# Patient Record
Sex: FEMALE | Race: WHITE | NOT HISPANIC OR LATINO | Employment: FULL TIME | URBAN - NONMETROPOLITAN AREA
[De-identification: names, ages, dates, MRNs, and addresses within clinical notes are randomized per-mention and may not be internally consistent; named-entity substitution may affect disease eponyms.]

---

## 2021-10-26 ENCOUNTER — HOSPITAL ENCOUNTER (EMERGENCY)
Facility: HOSPITAL | Age: 61
End: 2021-10-26

## 2024-10-29 ENCOUNTER — OFFICE VISIT (OUTPATIENT)
Dept: FAMILY MEDICINE CLINIC | Facility: CLINIC | Age: 64
End: 2024-10-29
Payer: MEDICAID

## 2024-10-29 VITALS
OXYGEN SATURATION: 96 % | RESPIRATION RATE: 14 BRPM | HEART RATE: 63 BPM | BODY MASS INDEX: 42.32 KG/M2 | SYSTOLIC BLOOD PRESSURE: 132 MMHG | WEIGHT: 254 LBS | HEIGHT: 65 IN | DIASTOLIC BLOOD PRESSURE: 81 MMHG

## 2024-10-29 DIAGNOSIS — E66.01 CLASS 3 SEVERE OBESITY WITHOUT SERIOUS COMORBIDITY WITH BODY MASS INDEX (BMI) OF 40.0 TO 44.9 IN ADULT, UNSPECIFIED OBESITY TYPE: Primary | ICD-10-CM

## 2024-10-29 DIAGNOSIS — Z85.3 HISTORY OF BREAST CANCER: ICD-10-CM

## 2024-10-29 DIAGNOSIS — E66.813 CLASS 3 SEVERE OBESITY WITHOUT SERIOUS COMORBIDITY WITH BODY MASS INDEX (BMI) OF 40.0 TO 44.9 IN ADULT, UNSPECIFIED OBESITY TYPE: Primary | ICD-10-CM

## 2024-10-29 DIAGNOSIS — E11.9 TYPE 2 DIABETES MELLITUS WITHOUT COMPLICATION, WITHOUT LONG-TERM CURRENT USE OF INSULIN: ICD-10-CM

## 2024-10-29 DIAGNOSIS — G89.29 CHRONIC PAIN OF LEFT ANKLE: ICD-10-CM

## 2024-10-29 DIAGNOSIS — I10 PRIMARY HYPERTENSION: ICD-10-CM

## 2024-10-29 DIAGNOSIS — M15.0 PRIMARY OSTEOARTHRITIS INVOLVING MULTIPLE JOINTS: ICD-10-CM

## 2024-10-29 DIAGNOSIS — M25.572 CHRONIC PAIN OF LEFT ANKLE: ICD-10-CM

## 2024-10-29 DIAGNOSIS — G47.33 OSA ON CPAP: ICD-10-CM

## 2024-10-29 DIAGNOSIS — Z00.00 PREVENTATIVE HEALTH CARE: ICD-10-CM

## 2024-10-29 DIAGNOSIS — K21.9 GASTROESOPHAGEAL REFLUX DISEASE WITHOUT ESOPHAGITIS: ICD-10-CM

## 2024-10-29 RX ORDER — ANASTROZOLE 1 MG/1
1 TABLET ORAL DAILY
COMMUNITY
Start: 2024-10-02

## 2024-10-29 RX ORDER — MELOXICAM 15 MG/1
1 TABLET ORAL DAILY
COMMUNITY
Start: 2024-08-04

## 2024-10-29 RX ORDER — ACETAMINOPHEN 160 MG
1 TABLET,DISINTEGRATING ORAL DAILY
COMMUNITY
Start: 2024-10-14

## 2024-10-29 RX ORDER — SEMAGLUTIDE 0.68 MG/ML
0.25 INJECTION, SOLUTION SUBCUTANEOUS WEEKLY
COMMUNITY
Start: 2024-10-16 | End: 2024-10-29 | Stop reason: SDUPTHER

## 2024-10-29 RX ORDER — SEMAGLUTIDE 0.68 MG/ML
0.5 INJECTION, SOLUTION SUBCUTANEOUS WEEKLY
Qty: 9 ML | Refills: 0 | Status: SHIPPED | OUTPATIENT
Start: 2024-10-29

## 2024-10-29 RX ORDER — ALENDRONATE SODIUM 70 MG/1
1 TABLET ORAL WEEKLY
COMMUNITY
Start: 2024-10-23

## 2024-10-29 NOTE — PROGRESS NOTES
Chief Complaint  Chief Complaint   Patient presents with    [facility]         Subjective          Heidy Helms presents to [facility] for   History of Present Illness    New 64-year-old female patient presents to [facility] with new provider.  Her previous PCP was Dr. Her at [facility].  She has a past medical history of:     Breast cancer, diagnosed April 2020. Underwent B mastectomy, no rad/chemo, she is taking anastrozole, follows with oncology.    Diabetes mellitus type II, patient is on Ozempic 0.25 mg weekly, blood sugar has been controlled, she is tolerating Ozempic well, she is hoping to lose weight. Feels stable on meds, denies any signs/symptoms of hyper/hypoglycemia, blurry vision, polydipsia, polyuria, nocturia, and unintentional weight loss. She reports L ball of foot numbness/tingling that occurred one time.     HTN, stable on meds and takes as directed, denies chest pain, headache, shortness of air, palpitations and swelling of extremities.     GERD, stable on medication, denies nausea, vomiting, constipation, abdominal pain and diarrhea.    Arthritis, of hips, knees, shoulders, saw Dr. Broderick for R hip, had injection to R hip, no improvement. she takes meloxicam daily. C/o L medial ankle pain, pressure on inside of foot causes pain, she walks on lateral side of foot. Dr. Her thought was Plantar Fasciitis but, there is no plantar pain. She reports an injury where a cable wrapped around her ankle and also a sprain to the left ankle 8 years ago. The ankle gives out on her.     Osteoporosis, taking alendronate weekly    MARGIE, she is using cpap nightly, had sleep study through Dr. Her, uses [facility].     Hearing loss L>R, saw ENT Dr. Davis, could not tolerate MRI to r/o auditory tumor    She had labs collected at [facility] yesterday, available in chart for review        The following portions of the patient's history were reviewed and updated as  "appropriate: allergies, current medications, past family history, past medical history, past social history, past surgical history and problem list.    Past Medical History:   Diagnosis Date    Arthritis     Cancer     Diabetes mellitus     Hypertension     Osteoporosis      Past Surgical History:   Procedure Laterality Date    BREAST SURGERY      HYSTERECTOMY      TONSILLECTOMY       Family History   Problem Relation Age of Onset    Cancer Mother     Cancer Sister     Cancer Maternal Aunt     Cancer Maternal Grandmother      Social History     Tobacco Use    Smoking status: Never     Passive exposure: Never    Smokeless tobacco: Never   Substance Use Topics    Alcohol use: Never       Current Outpatient Medications:     alendronate (FOSAMAX) 70 MG tablet, Take 1 tablet by mouth 1 (One) Time Per Week., Disp: , Rfl:     anastrozole (ARIMIDEX) 1 MG tablet, Take 1 tablet by mouth Daily., Disp: , Rfl:     Cholecalciferol (Vitamin D3) 50 MCG (2000 UT) capsule, Take 1 capsule by mouth Daily., Disp: , Rfl:     hydroCHLOROthiazide (HYDRODIURIL) 25 MG tablet, Take 1 tablet by mouth Daily., Disp: , Rfl:     lisinopril (PRINIVIL,ZESTRIL) 20 MG tablet, Take 1 tablet by mouth Daily., Disp: , Rfl:     meloxicam (MOBIC) 15 MG tablet, Take 1 tablet by mouth Daily., Disp: , Rfl:     omeprazole (priLOSEC) 20 MG capsule, Take 1 capsule by mouth Daily., Disp: , Rfl:     Ozempic, 0.25 or 0.5 MG/DOSE, 2 MG/3ML solution pen-injector, Inject 0.5 mg under the skin into the appropriate area as directed 1 (One) Time Per Week., Disp: 9 mL, Rfl: 0    Objective   Vital Signs:   /81 (BP Location: Right arm, Patient Position: Sitting, Cuff Size: Large Adult)   Pulse 63   Resp 14   Ht 165.1 cm (65\")   Wt 115 kg (254 lb)   SpO2 96%   BMI 42.27 kg/m²           Physical Exam  Constitutional:       General: She is not in acute distress.     Appearance: Normal appearance. She is well-developed. She is obese. She is not ill-appearing or " diaphoretic.   HENT:      Head: Normocephalic.   Eyes:      Conjunctiva/sclera: Conjunctivae normal.      Pupils: Pupils are equal, round, and reactive to light.   Neck:      Thyroid: No thyromegaly.      Vascular: No JVD.   Cardiovascular:      Rate and Rhythm: Normal rate and regular rhythm.      Heart sounds: Normal heart sounds. No murmur heard.  Pulmonary:      Effort: Pulmonary effort is normal. No respiratory distress.      Breath sounds: Normal breath sounds. No wheezing or rhonchi.   Abdominal:      General: Bowel sounds are normal. There is no distension.      Palpations: Abdomen is soft.      Tenderness: There is no abdominal tenderness.   Musculoskeletal:         General: Tenderness (L medial ankle, mild jovel rom) present. No swelling. Normal range of motion.      Cervical back: Normal range of motion and neck supple. No tenderness.   Lymphadenopathy:      Cervical: No cervical adenopathy.   Skin:     General: Skin is warm and dry.      Coloration: Skin is not jaundiced.      Findings: No erythema or rash.   Neurological:      General: No focal deficit present.      Mental Status: She is alert and oriented to person, place, and time. Mental status is at baseline.      Sensory: No sensory deficit.      Motor: No weakness.      Gait: Gait normal.   Psychiatric:         Mood and Affect: Mood normal.         Behavior: Behavior normal.         Thought Content: Thought content normal.         Judgment: Judgment normal.          Result Review :     No visits with results within 7 Day(s) from this visit.   Latest known visit with results is:   No results found for any previous visit.                  Class 3 Severe Obesity (BMI >=40). Obesity-related health conditions include the following: hypertension, diabetes mellitus, dyslipidemias, and osteoarthritis. Obesity is unchanged. BMI is is above average; BMI management plan is completed. We discussed portion control and increasing exercise.           Assessment  and Plan    Diagnoses and all orders for this visit:    1. Class 3 severe obesity without serious comorbidity with body mass index (BMI) of 40.0 to 44.9 in adult, unspecified obesity type (Primary)    2. Type 2 diabetes mellitus without complication, without long-term current use of insulin  -     Ambulatory Referral to Podiatry    3. History of breast cancer    4. Chronic pain of left ankle  -     Ambulatory Referral to Podiatry    5. Preventative health care    6. MARGIE on CPAP    7. Primary hypertension    8. Gastroesophageal reflux disease without esophagitis    9. Primary osteoarthritis involving multiple joints    Other orders  -     Ozempic, 0.25 or 0.5 MG/DOSE, 2 MG/3ML solution pen-injector; Inject 0.5 mg under the skin into the appropriate area as directed 1 (One) Time Per Week.  Dispense: 9 mL; Refill: 0        Will request colonoscopy report, dexa from [facility] and eye exam from Dr. Adams.   Pt reports vaccines are up to date. Will request vaccines from [email or web address] in [place]  Labs reviewed  Rec DM diet, increase exercise as able  Increase ozempic       I spent 45 minutes caring for Heidy Helms on this date of service. This time includes time spent by me in the following activities: preparing for the visit, reviewing tests, performing a medically appropriate examination and/or evaluation , counseling and educating the patient/family/caregiver, ordering medications, tests, or procedures and documenting information in the medical record        Follow Up     Return in about 6 months (around 4/29/2025) for Recheck.  Patient was given instructions and counseling regarding her condition or for health maintenance advice. Please see specific information pulled into the AVS if appropriate.        Part of this note may be an electronic transcription/translation of spoken language to printed text using the Dragon Dictation System

## 2024-11-05 ENCOUNTER — TELEPHONE (OUTPATIENT)
Dept: FAMILY MEDICINE CLINIC | Facility: CLINIC | Age: 64
End: 2024-11-05

## 2024-11-05 NOTE — TELEPHONE ENCOUNTER
Caller: Heidy Helms    Relationship: Self    Best call back number: [phone]    Who are you requesting to speak with (clinical staff, provider,  specific staff member): ANY    What was the call regarding: WOULD LIKE A CALLBACK TO DISCUSS THE PODIATRY REFERRAL THAT SHE HASN'T RECEIVED A CALLBACK FROM

## 2024-11-05 NOTE — TELEPHONE ENCOUNTER
GLEN. Referral was sent to Dr. Gagnon. His phone number to call and schedule an appt is [phone]     relay

## 2024-11-12 ENCOUNTER — OFFICE VISIT (OUTPATIENT)
Dept: PODIATRY | Facility: CLINIC | Age: 64
End: 2024-11-12
Payer: MEDICAID

## 2024-11-12 VITALS — BODY MASS INDEX: 42.32 KG/M2 | WEIGHT: 254 LBS | HEIGHT: 65 IN | HEART RATE: 80 BPM | OXYGEN SATURATION: 95 %

## 2024-11-12 DIAGNOSIS — E66.01 MORBID OBESITY WITH BMI OF 40.0-44.9, ADULT: ICD-10-CM

## 2024-11-12 DIAGNOSIS — M79.672 LEFT FOOT PAIN: Primary | ICD-10-CM

## 2024-11-12 DIAGNOSIS — M72.2 PLANTAR FASCIITIS, LEFT: ICD-10-CM

## 2024-11-12 DIAGNOSIS — M76.822 POSTERIOR TIBIAL TENDINITIS, LEFT: ICD-10-CM

## 2024-11-12 DIAGNOSIS — M21.862 ACQUIRED POSTERIOR EQUINUS, LEFT: ICD-10-CM

## 2024-11-13 NOTE — PROGRESS NOTES
11/12/2024  Foot and Ankle Surgery - New Patient   Provider: Dr. Josef Gagnon DPM  Location: [facility]    Subjective:  Heidy Helms is a 64 y.o. female.     Chief Complaint   Patient presents with    Right Foot - Pain, Follow-up    Initial Evaluation     KYLE Rose billy 10/29/2024       History of Present Illness  Patient is a 64-year-old female that presents with issues involving her left lower extremity.  Patient complains of discomfort involving the posterior medial aspect of the foot and ankle.  Her symptoms started a few weeks ago.  He has noticed mild improvements.  She states that symptoms are worse with increased activity and uneven terrain.  Patient has not had issues like this in the past.  She is unaware of any injury to the area.  She states that she has gained weight which could be complicating this issue.  She denies any significant issues involving the right lower extremity.       No Known Allergies    Past Medical History:   Diagnosis Date    Arthritis     Cancer     Diabetes mellitus     Hypertension     Osteoporosis        Past Surgical History:   Procedure Laterality Date    BREAST SURGERY      HYSTERECTOMY      TONSILLECTOMY         Family History   Problem Relation Age of Onset    Cancer Mother     Cancer Sister     Cancer Maternal Aunt     Cancer Maternal Grandmother        Social History     Socioeconomic History    Marital status: Single   Tobacco Use    Smoking status: Never     Passive exposure: Never    Smokeless tobacco: Never   Vaping Use    Vaping status: Never Used   Substance and Sexual Activity    Alcohol use: Never    Drug use: Never    Sexual activity: Defer        Current Outpatient Medications on File Prior to Visit   Medication Sig Dispense Refill    alendronate (FOSAMAX) 70 MG tablet Take 1 tablet by mouth 1 (One) Time Per Week.      anastrozole (ARIMIDEX) 1 MG tablet Take 1 tablet by mouth Daily.      Cholecalciferol (Vitamin D3) 50 MCG (2000 UT) capsule Take 1  "capsule by mouth Daily.      hydroCHLOROthiazide (HYDRODIURIL) 25 MG tablet Take 1 tablet by mouth Daily.      lisinopril (PRINIVIL,ZESTRIL) 20 MG tablet Take 1 tablet by mouth Daily.      meloxicam (MOBIC) 15 MG tablet Take 1 tablet by mouth Daily.      omeprazole (priLOSEC) 20 MG capsule Take 1 capsule by mouth Daily.      Ozempic, 0.25 or 0.5 MG/DOSE, 2 MG/3ML solution pen-injector Inject 0.5 mg under the skin into the appropriate area as directed 1 (One) Time Per Week. 9 mL 0     No current facility-administered medications on file prior to visit.         Objective   Pulse 80   Ht 165.1 cm (65\")   Wt 115 kg (254 lb)   SpO2 95%   BMI 42.27 kg/m²     Foot/Ankle Exam    GENERAL  Appearance:  appears stated age and obese  Orientation:  AAOx3  Affect:  appropriate    VASCULAR     Left Foot Vascularity   Normal vascular exam    Dorsalis pedis:  2+  Posterior tibial:  2+  Skin temperature:  warm  Edema grading:  None  CFT:  < 3 seconds  Pedal hair growth:  Present  Varicosities:  none     NEUROLOGIC     Left Foot Neurologic   Light touch sensation: normal  Hot/Cold sensation:  normal  Achilles reflex:  2+    MUSCULOSKELETAL     Left Foot Musculoskeletal   Ecchymosis:  none  Tenderness:  plantar arch tenderness, plantar fascia tenderness and posterior tibial tendon tenderness  Arch:  Normal    MUSCLE STRENGTH     Left Foot Muscle Strength   Normal strength    Foot dorsiflexion:  5  Foot plantar flexion:  5  Foot inversion:  5  Foot eversion:  5    RANGE OF MOTION     Left Foot Range of Motion   Foot and ankle ROM within normal limits      DERMATOLOGIC        Left Foot Dermatologic   Skin  Left foot skin is intact.     TESTS     Left Foot Tests   Anterior drawer: negative  Varus tilt: negative     Left foot additional comments: Discomfort along the distal course of the posterior tibial tendon.  No gross deformity or instability to the foot.  Mild to moderate equinus contracture with knee extended and " flexed.    Physical Exam         Results         Assessment & Plan   Diagnoses and all orders for this visit:    1. Left foot pain (Primary)  -     XR Foot 3+ View Right    2. Posterior tibial tendinitis, left  -     Ambulatory Referral to Physical Therapy for Evaluation & Treatment    3. Plantar fasciitis, left  -     Ambulatory Referral to Physical Therapy for Evaluation & Treatment    4. Acquired posterior equinus, left  -     Ambulatory Referral to Physical Therapy for Evaluation & Treatment    5. Morbid obesity with BMI of 40.0-44.9, adult       Assessment & Plan  Patient presents with pain and limitation involving the left foot.  After evaluation, I do feel that her symptoms are consistent with posterior tibial tendinitis.  I reviewed the diagnosis and treatment options with her at length.  We did review imaging in office showing no obvious fracture or dislocation.  I did review appropriate shoes and inserts.  I have advised her on a pair of power step inserts with a metatarsal pad for proper offloading.  We also discussed proper at home stretching and manual therapy exercises.  I do feel that she would benefit from formal physical therapy.  Patient is to avoid barefoot and unsupported weightbearing.  I did review rice therapy and proper use of OTC anti-inflammatories.  I have suggested that patient avoid uneven terrain and high-impact activity.  We did discuss appropriate low impact exercises.  Patient is to follow-up with me in 6 weeks for reevaluation.  Greater than 30 minutes spent before, during, and after evaluation for patient care.               INES Gagnon DPM

## 2024-12-17 ENCOUNTER — TELEPHONE (OUTPATIENT)
Dept: FAMILY MEDICINE CLINIC | Facility: CLINIC | Age: 64
End: 2024-12-17

## 2024-12-17 NOTE — TELEPHONE ENCOUNTER
Caller: Heidy Helms    Relationship to patient: Self    Best call back number: [phone]          Patient is needing: PATIENT CALLING IN TO GIVE UPDATE ON OZEMPIC. SHE DOES GET LIGHTHEADED/DIZZY FROM TIME TO TIME. STATES HER RIGHT ARM WILL GO NUMB FOR LIKE 5 SECONDS. ALSO SHE WOKE UP ONE NIGHT WITH A STABBING PAIN IN BETWEEN SHOULDER BLADES AND THAT LASTED ABOUT 1 HOUR. SHE TOOK ASPRIN AND IT CALMED DOWN.

## 2024-12-18 NOTE — TELEPHONE ENCOUNTER
Pt states she googled her back pain which has increased. Believes it is affecting her pancreas which is causing her back pain. Pt requests to discontinue ozempic and try something new. States pain is right side only and below shoulder blade.

## 2025-01-20 ENCOUNTER — TELEPHONE (OUTPATIENT)
Dept: FAMILY MEDICINE CLINIC | Facility: CLINIC | Age: 65
End: 2025-01-20

## 2025-01-20 ENCOUNTER — TELEPHONE (OUTPATIENT)
Dept: FAMILY MEDICINE CLINIC | Facility: CLINIC | Age: 65
End: 2025-01-20
Payer: MEDICAID

## 2025-01-20 NOTE — TELEPHONE ENCOUNTER
PT. Thinks she would like to get off of Ozempic, but maybe get on a pill, to help control the diabetes??  She is seeing her [facility]cologist on Thursday... she wants to see you later on...          PT. CALLED, WENT TO ER IN [facility], DID TESTS AND SAID HER CANCER HAS SPREAD INTO LIVER, LUNGS, AND GASTRO.  TOMASA WOULD LIKE TO TALK  ABOUT COMING TO [facility] INSTEAD OF [place], PLEASE GIVE HER A CALL AS OF NOW SHE IS STILL IN ER ..

## 2025-01-20 NOTE — TELEPHONE ENCOUNTER
Caller: Heidy Helms    Relationship: Self    Best call back number: [phone]    What is the best time to reach you: ANY    Who are you requesting to speak with (clinical staff, provider,  specific staff member): CLINICAL    Do you know the name of the person who called: PATIENT    What was the call regarding: PATIENT STATED HAVING A SHARP PAIN IN HER RIGHT SIDE UNDER RIB CAGE.    PATIENT STATED SHE HAD GALLBLADDER SURGERY AND ITS THE SAME AREA AND SIDE.    PATIENT STATED SHE WAS GOING TO GO TO HOSPITAL IN [place].    PATIENT REQUESTED AN APPT WITH KOFFI LOBO.    Is it okay if the provider responds through MyChart: NO

## 2025-01-22 ENCOUNTER — TELEPHONE (OUTPATIENT)
Dept: FAMILY MEDICINE CLINIC | Facility: CLINIC | Age: 65
End: 2025-01-22

## 2025-01-22 NOTE — TELEPHONE ENCOUNTER
Caller: Heidy Helms    Relationship: Self    Best call back number: [phone]     What medication are you requesting: ALTERNATIVE FOR HYDROCODONE 5/325 MG    What are your current symptoms: PAIN    How long have you been experiencing symptoms:     Have you had these symptoms before:    [] Yes  [] No    Have you been treated for these symptoms before:   [] Yes  [] No    If a prescription is needed, what is your preferred pharmacy and phone number: [id number] - [facility] [place] [address] [phone] [facility] [phone]      Additional notes:PATIENT STATES THIS MEDICATION IS NOT HELPING WITH THE PAIN.

## 2025-01-22 NOTE — TELEPHONE ENCOUNTER
LMOM use and disclosure dept with [facility] requesting visit and any associated- progress notes, labs, imaging

## 2025-01-22 NOTE — TELEPHONE ENCOUNTER
Caller: Heidy Helms    Relationship: Self    Best call back number: [phone]     What is the medical concern/diagnosis:     What specialty or service is being requested: ONCOLOGY     What is the provider, practice or medical service name:     What is the office location:     What is the office phone number:     Any additional details:

## 2025-01-27 ENCOUNTER — READMISSION MANAGEMENT (OUTPATIENT)
Dept: CALL CENTER | Facility: HOSPITAL | Age: 65
End: 2025-01-27
Payer: MEDICAID

## 2025-01-28 ENCOUNTER — TRANSITIONAL CARE MANAGEMENT TELEPHONE ENCOUNTER (OUTPATIENT)
Dept: CALL CENTER | Facility: HOSPITAL | Age: 65
End: 2025-01-28
Payer: MEDICAID

## 2025-01-28 NOTE — OUTREACH NOTE
Prep Survey      Flowsheet Row Responses   [facility] facility patient discharged from? Non-BH   Is LACE score < 7 ? Non-BH Discharge   Eligibility [facility]   Date of Admission 01/23/25   Date of Discharge 01/27/25   Discharge Disposition Home or Self Care   Discharge diagnosis Widespread metastatic malignant neoplastic disease   Does the patient have one of the following disease processes/diagnoses(primary or secondary)? Other   Does the patient have Home health ordered? No   Prep survey completed? Yes            Jennifer JONES - Registered Nurse

## 2025-01-28 NOTE — OUTREACH NOTE
Call Center TCM Note      Flowsheet Row Responses   [facility]  [facility]   Does the patient have one of the following disease processes/diagnoses(primary or secondary)? Other   TCM attempt successful? Yes   Call start time 1303   Call end time 1314   Discharge diagnosis Widespread metastatic malignant neoplastic disease   Meds reviewed with patient/caregiver? Yes   Is the patient having any side effects they believe may be caused by any medication additions or changes? No   Does the patient have all medications ordered at discharge? Yes   Prescription comments New rx's Compazine, Senokot, Hydrocodone-acet 7.5/325, Dexamethasone, Colace in place. Pt has concerns re: some of her long time meds she was told to discontinue, will discuss with PCP at TCM APPT   Is the patient taking all medications as directed (includes completed medication regime)? Yes   Comments TCM APPT WITH PCP TERI LOBO IS 02/03/2025   Does the patient have an appointment with their PCP within 7-14 days of discharge? Yes   Has home health visited the patient within 72 hours of discharge? N/A   Psychosocial issues? No   Did the patient receive a copy of their discharge instructions? Yes   Nursing interventions Reviewed instructions with patient   What is the patient's perception of their health status since discharge? Same   Is the patient/caregiver able to teach back signs and symptoms related to disease process for when to call PCP? Yes   Is the patient/caregiver able to teach back signs and symptoms related to disease process for when to call 911? Yes   Is the patient/caregiver able to teach back the hierarchy of who to call/visit for symptoms/problems? PCP, Specialist, Home health nurse, Urgent Care, ED, 911 Yes   If the patient is a current smoker, are they able to teach back resources for cessation? Not a smoker   TCM call completed? Yes   Wrap up additional comments D/C DX: Widespread  metastatic malignant neoplastic disease - Pt managing as well as possible. No questions at this time. Pt has alot she wants to discuss with PCP TERI Rose at [facility] APPT 02/03/2025.   Call end time [id number]            Smitha Rollins MA    1/28/2025, 13:18 EST

## 2025-02-03 ENCOUNTER — OFFICE VISIT (OUTPATIENT)
Dept: FAMILY MEDICINE CLINIC | Facility: CLINIC | Age: 65
End: 2025-02-03
Payer: MEDICAID

## 2025-02-03 VITALS
WEIGHT: 256 LBS | HEART RATE: 59 BPM | DIASTOLIC BLOOD PRESSURE: 98 MMHG | BODY MASS INDEX: 42.65 KG/M2 | SYSTOLIC BLOOD PRESSURE: 158 MMHG | OXYGEN SATURATION: 97 % | HEIGHT: 65 IN

## 2025-02-03 DIAGNOSIS — E11.9 TYPE 2 DIABETES MELLITUS WITHOUT COMPLICATION, WITHOUT LONG-TERM CURRENT USE OF INSULIN: ICD-10-CM

## 2025-02-03 DIAGNOSIS — R06.09 DYSPNEA ON EXERTION: ICD-10-CM

## 2025-02-03 DIAGNOSIS — G89.29 CHRONIC PAIN OF LEFT ANKLE: ICD-10-CM

## 2025-02-03 DIAGNOSIS — M25.572 CHRONIC PAIN OF LEFT ANKLE: ICD-10-CM

## 2025-02-03 DIAGNOSIS — M16.0 PRIMARY OSTEOARTHRITIS OF BOTH HIPS: ICD-10-CM

## 2025-02-03 DIAGNOSIS — R53.1 GENERALIZED WEAKNESS: ICD-10-CM

## 2025-02-03 DIAGNOSIS — L30.9 DERMATITIS: ICD-10-CM

## 2025-02-03 DIAGNOSIS — Z85.3 HISTORY OF BREAST CANCER: ICD-10-CM

## 2025-02-03 DIAGNOSIS — M15.0 PRIMARY OSTEOARTHRITIS INVOLVING MULTIPLE JOINTS: ICD-10-CM

## 2025-02-03 DIAGNOSIS — C79.9 ADENOCARCINOMA, METASTATIC: Primary | ICD-10-CM

## 2025-02-03 DIAGNOSIS — M17.0 PRIMARY OSTEOARTHRITIS OF BOTH KNEES: ICD-10-CM

## 2025-02-03 DIAGNOSIS — I10 PRIMARY HYPERTENSION: ICD-10-CM

## 2025-02-03 DIAGNOSIS — F41.9 ANXIETY: ICD-10-CM

## 2025-02-03 DIAGNOSIS — K59.00 CONSTIPATION, UNSPECIFIED CONSTIPATION TYPE: ICD-10-CM

## 2025-02-03 DIAGNOSIS — G47.09 OTHER INSOMNIA: ICD-10-CM

## 2025-02-03 PROCEDURE — 1159F MED LIST DOCD IN RCRD: CPT | Performed by: NURSE PRACTITIONER

## 2025-02-03 PROCEDURE — 99215 OFFICE O/P EST HI 40 MIN: CPT | Performed by: NURSE PRACTITIONER

## 2025-02-03 PROCEDURE — 1160F RVW MEDS BY RX/DR IN RCRD: CPT | Performed by: NURSE PRACTITIONER

## 2025-02-03 PROCEDURE — 1125F AMNT PAIN NOTED PAIN PRSNT: CPT | Performed by: NURSE PRACTITIONER

## 2025-02-03 PROCEDURE — 99417 PROLNG OP E/M EACH 15 MIN: CPT | Performed by: NURSE PRACTITIONER

## 2025-02-03 RX ORDER — ALPRAZOLAM 0.5 MG
0.5 TABLET ORAL 2 TIMES DAILY PRN
Qty: 60 TABLET | Refills: 0 | Status: SHIPPED | OUTPATIENT
Start: 2025-02-03

## 2025-02-03 RX ORDER — PSEUDOEPHEDRINE HCL 30 MG
100 TABLET ORAL 2 TIMES DAILY PRN
Qty: 60 EACH | Refills: 2 | Status: SHIPPED | OUTPATIENT
Start: 2025-02-03

## 2025-02-03 RX ORDER — LISINOPRIL 20 MG/1
20 TABLET ORAL DAILY
Qty: 90 TABLET | Refills: 1 | Status: SHIPPED | OUTPATIENT
Start: 2025-02-03

## 2025-02-03 RX ORDER — PSEUDOEPHEDRINE HCL 30 MG
200 TABLET ORAL
COMMUNITY
Start: 2025-01-27 | End: 2025-02-03 | Stop reason: SDUPTHER

## 2025-02-03 RX ORDER — PROMETHAZINE HYDROCHLORIDE 25 MG/1
12.5-25 TABLET ORAL
COMMUNITY
Start: 2025-01-29 | End: 2025-02-03 | Stop reason: SDUPTHER

## 2025-02-03 RX ORDER — HYDROCODONE BITARTRATE AND ACETAMINOPHEN 7.5; 325 MG/1; MG/1
1 TABLET ORAL EVERY 8 HOURS PRN
Qty: 60 TABLET | Refills: 0 | Status: SHIPPED | OUTPATIENT
Start: 2025-02-03 | End: 2025-03-05

## 2025-02-03 RX ORDER — DEXAMETHASONE 4 MG/1
4 TABLET ORAL 3 TIMES DAILY
COMMUNITY
Start: 2025-01-27

## 2025-02-03 RX ORDER — PROMETHAZINE HYDROCHLORIDE 25 MG/1
12.5-25 TABLET ORAL EVERY 8 HOURS PRN
Qty: 60 TABLET | Refills: 2 | Status: SHIPPED | OUTPATIENT
Start: 2025-02-03

## 2025-02-03 RX ORDER — HYDROCODONE BITARTRATE AND ACETAMINOPHEN 7.5; 325 MG/1; MG/1
1 TABLET ORAL
COMMUNITY
Start: 2025-01-27 | End: 2025-02-03 | Stop reason: SDUPTHER

## 2025-02-03 RX ORDER — HYDROCHLOROTHIAZIDE 25 MG/1
25 TABLET ORAL DAILY
Qty: 90 TABLET | Refills: 1 | Status: SHIPPED | OUTPATIENT
Start: 2025-02-03

## 2025-02-03 RX ORDER — SENNOSIDES A AND B 8.6 MG/1
1 TABLET, FILM COATED ORAL 2 TIMES DAILY PRN
Qty: 60 TABLET | Refills: 5 | Status: SHIPPED | OUTPATIENT
Start: 2025-02-03

## 2025-02-03 RX ORDER — PROCHLORPERAZINE MALEATE 10 MG
10 TABLET ORAL EVERY 8 HOURS PRN
Qty: 60 TABLET | Refills: 2 | Status: SHIPPED | OUTPATIENT
Start: 2025-02-03

## 2025-02-03 RX ORDER — MELOXICAM 15 MG/1
15 TABLET ORAL DAILY PRN
Qty: 90 TABLET | Refills: 1 | Status: SHIPPED | OUTPATIENT
Start: 2025-02-03

## 2025-02-03 RX ORDER — PROCHLORPERAZINE MALEATE 10 MG
10 TABLET ORAL
COMMUNITY
Start: 2025-01-27 | End: 2025-02-03 | Stop reason: SDUPTHER

## 2025-02-03 RX ORDER — SENNOSIDES A AND B 8.6 MG/1
17.2-34.4 TABLET, FILM COATED ORAL
COMMUNITY
Start: 2025-01-27 | End: 2025-02-03 | Stop reason: SDUPTHER

## 2025-02-03 NOTE — PROGRESS NOTES
Chief Complaint  Chief Complaint   Patient presents with    Hospital Follow Up Visit     Discharged [place] 1/27/25  Needs rf nausea, pain pills             Subjective          Heidy Helms presents to [facility] for   History of Present Illness    Patient with past medical history of breast cancer diagnosed April 2020, underwent bilateral mastectomy, no radiation or chemo, has been following with oncology as directed, labs have been stable, she reports not having any follow-up imaging for 3-4 years.   She presented to [facility] mid january w/RUQ pain.  Scans and imaging revealed widespread metastasis, underwent colonoscopy with mass found biopsies and + adenocarcinoma, she incidentally has liver, lung and brain lesions, still attempting to determine source.  Patient was discharged home, she presented to [facility] ED on 1/23/2025 on 1/24/2025 had liver biopsy, positive for adenocarcinoma, likely metastasis from breast.  She is following with [facility] oncology, she has an appointment on 2/4/2025 to discuss starting brain radiation at [facility].  PET scan is scheduled  MRI Brain With & Without Contrast (01/25/2025 01:33)   US Guided Needle Bx Liver (01/24/2025 14:21)   CT Chest W Contrast (01/24/2025 05:55)     The patient complains of severe nausea, RUQ pain, constipation, shortness of air, she is currently on 3 L nasal cannula through [employer] she is extremely weak, having difficulty walking even 10 feet, and requesting a rollator walker. She is requesting an aid and nurse to see her within the home     L ankle/lateral leg pain, has been doing physical therapy, pain is improving, mobility improving, however she is currently unable to attend physical therapy due to new cancer diagnoses      She has chronic severe osteoarthritis of bilateral knees and hips, she has followed with Dr. Meggan Gutierrez in the past, received injections to the right hip with minimal  improvement.  She is requesting a lift chair    She reports a rash on the hands, it is dry and itchy since starting new meds    Anxiety, new 2/2 cancer, she is on dexamethasone 4 mg 3 times a day currently for cancer, not sleeping well at night.  She is typically not an anxious person    DMII, 5.8 in October at [facility], not currently on diabetic medications, she has not been checking blood sugar at home.                  The following portions of the patient's history were reviewed and updated as appropriate: allergies, current medications, past family history, past medical history, past social history, past surgical history and problem list.    Past Medical History:   Diagnosis Date    Arthritis     Cancer     Diabetes mellitus     Hypertension     Osteoporosis      Past Surgical History:   Procedure Laterality Date    BREAST SURGERY      HYSTERECTOMY      TONSILLECTOMY       Family History   Problem Relation Age of Onset    Cancer Mother     Cancer Sister     Cancer Maternal Aunt     Cancer Maternal Grandmother      Social History     Tobacco Use    Smoking status: Never     Passive exposure: Never    Smokeless tobacco: Never   Substance Use Topics    Alcohol use: Never       Current Outpatient Medications:     Cholecalciferol (Vitamin D3) 50 MCG (2000 UT) capsule, Take 1 capsule by mouth Daily., Disp: , Rfl:     dexAMETHasone (DECADRON) 4 MG tablet, Take 1 tablet by mouth 3 (Three) Times a Day., Disp: , Rfl:     docusate sodium 100 MG capsule, Take 1 capsule by mouth 2 (Two) Times a Day As Needed (constipation)., Disp: 60 each, Rfl: 2    hydroCHLOROthiazide 25 MG tablet, Take 1 tablet by mouth Daily., Disp: 90 tablet, Rfl: 1    HYDROcodone-acetaminophen (NORCO) 7.5-325 MG per tablet, Take 1 tablet by mouth Every 8 (Eight) Hours As Needed for Severe Pain for up to 30 days., Disp: 60 tablet, Rfl: 0    lisinopril (PRINIVIL,ZESTRIL) 20 MG tablet, Take 1 tablet by mouth Daily., Disp: 90 tablet, Rfl:  "1    meloxicam (MOBIC) 15 MG tablet, Take 1 tablet by mouth Daily As Needed for Moderate Pain., Disp: 90 tablet, Rfl: 1    omeprazole (priLOSEC) 20 MG capsule, Take 1 capsule by mouth Daily., Disp: , Rfl:     prochlorperazine (COMPAZINE) 10 MG tablet, Take 1 tablet by mouth Every 8 (Eight) Hours As Needed for Nausea or Vomiting., Disp: 60 tablet, Rfl: 2    promethazine (PHENERGAN) 25 MG tablet, Take 0.5-1 tablets by mouth Every 8 (Eight) Hours As Needed for Nausea or Vomiting. Alternate with compazine, Disp: 60 tablet, Rfl: 2    senna (SENOKOT) 8.6 MG tablet, Take 1 tablet by mouth 2 (Two) Times a Day As Needed for Constipation., Disp: 60 tablet, Rfl: 5    ALPRAZolam (XANAX) 0.5 MG tablet, Take 1 tablet by mouth 2 (Two) Times a Day As Needed for Anxiety., Disp: 60 tablet, Rfl: 0    Blood Glucose Monitoring Suppl (Accu-Chek Guide) w/Device kit, Use 1 each Take As Directed. To check blood glucose daily, Disp: 1 kit, Rfl: 0    glucose blood (Accu-Chek Guide Test) test strip, Used to check blood sugar once daily, Disp: 100 each, Rfl: 3    Lancets Misc. (Accu-Chek Softclix Lancet Dev) kit, Use 1 each Daily. To check blood glucose daily, Disp: 1 kit, Rfl: 11    Misc. Devices (Rolling Walker/Burgundy) misc, Use 1 each Take As Directed. With seat and brakes for ambulation, Disp: 1 each, Rfl: 0    triamcinolone (KENALOG) 0.1 % ointment, Apply 1 Application topically to the appropriate area as directed 2 (Two) Times a Day As Needed for Irritation or Rash., Disp: 30 g, Rfl: 0    Objective   Vital Signs:   Vitals:    02/03/25 1524   BP: 158/98   BP Location: Left arm   Patient Position: Sitting   Cuff Size: Adult   Pulse: 59   SpO2: 97%   Weight: 116 kg (256 lb)   Height: 165.1 cm (65\")   PainSc:   7   PainLoc: Abdomen       Body mass index is 42.6 kg/m².    Physical Exam  Constitutional:       General: She is not in acute distress.     Appearance: Normal appearance. She is well-developed. She is obese. She is ill-appearing. " She is not diaphoretic.   HENT:      Head: Normocephalic.   Eyes:      Conjunctiva/sclera: Conjunctivae normal.      Pupils: Pupils are equal, round, and reactive to light.   Neck:      Thyroid: No thyromegaly.      Vascular: No JVD.   Cardiovascular:      Rate and Rhythm: Normal rate and regular rhythm.      Heart sounds: Normal heart sounds. No murmur heard.  Pulmonary:      Effort: Pulmonary effort is normal. No respiratory distress.      Breath sounds: Normal breath sounds. No wheezing or rhonchi.      Comments: Diminished bases, on 3 L nasal cannula.  STARKS  Abdominal:      General: Bowel sounds are normal. There is no distension.      Palpations: Abdomen is soft.      Tenderness: There is abdominal tenderness (RUQ).   Musculoskeletal:         General: No swelling or tenderness. Normal range of motion.      Cervical back: Normal range of motion and neck supple. No tenderness.   Lymphadenopathy:      Cervical: No cervical adenopathy.   Skin:     General: Skin is warm and dry.      Coloration: Skin is not jaundiced.      Findings: No erythema or rash.   Neurological:      General: No focal deficit present.      Mental Status: She is alert and oriented to person, place, and time. Mental status is at baseline.      Sensory: No sensory deficit.      Motor: Weakness present.      Gait: Gait abnormal.   Psychiatric:         Mood and Affect: Mood normal.         Behavior: Behavior normal.         Thought Content: Thought content normal.         Judgment: Judgment normal.          Result Review :     No visits with results within 7 Day(s) from this visit.   Latest known visit with results is:   No results found for any previous visit.                              Assessment and Plan    Diagnoses and all orders for this visit:    1. Adenocarcinoma, metastatic (Primary)  -     HYDROcodone-acetaminophen (NORCO) 7.5-325 MG per tablet; Take 1 tablet by mouth Every 8 (Eight) Hours As Needed for Severe Pain for up to 30 days.   Dispense: 60 tablet; Refill: 0  -     Patient Lift  -     Misc. Devices (Rolling Walker/Burgundy) misc; Use 1 each Take As Directed. With seat and brakes for ambulation  Dispense: 1 each; Refill: 0  -     Ambulatory Referral to Home Health    2. Anxiety  -     ALPRAZolam (XANAX) 0.5 MG tablet; Take 1 tablet by mouth 2 (Two) Times a Day As Needed for Anxiety.  Dispense: 60 tablet; Refill: 0  -     Misc. Devices (Rolling Walker/Burgundy) misc; Use 1 each Take As Directed. With seat and brakes for ambulation  Dispense: 1 each; Refill: 0  -     Ambulatory Referral to Home Health    3. Chronic pain of left ankle  -     HYDROcodone-acetaminophen (NORCO) 7.5-325 MG per tablet; Take 1 tablet by mouth Every 8 (Eight) Hours As Needed for Severe Pain for up to 30 days.  Dispense: 60 tablet; Refill: 0  -     Patient Lift  -     Misc. Devices (Rolling Walker/Burgundy) misc; Use 1 each Take As Directed. With seat and brakes for ambulation  Dispense: 1 each; Refill: 0  -     Ambulatory Referral to Home Health    4. History of breast cancer  -     Misc. Devices (Rolling Walker/Burgundy) misc; Use 1 each Take As Directed. With seat and brakes for ambulation  Dispense: 1 each; Refill: 0  -     Ambulatory Referral to Home Health    5. Type 2 diabetes mellitus without complication, without long-term current use of insulin  -     Misc. Devices (Rolling Walker/Burgundy) misc; Use 1 each Take As Directed. With seat and brakes for ambulation  Dispense: 1 each; Refill: 0  -     Ambulatory Referral to Home Health  -     glucose blood (Accu-Chek Guide Test) test strip; Used to check blood sugar once daily  Dispense: 100 each; Refill: 3  -     Blood Glucose Monitoring Suppl (Accu-Chek Guide) w/Device kit; Use 1 each Take As Directed. To check blood glucose daily  Dispense: 1 kit; Refill: 0  -     Lancets Misc. (Accu-Chek Softclix Lancet Dev) kit; Use 1 each Daily. To check blood glucose daily  Dispense: 1 kit; Refill: 11    6. Primary  osteoarthritis involving multiple joints  -     HYDROcodone-acetaminophen (NORCO) 7.5-325 MG per tablet; Take 1 tablet by mouth Every 8 (Eight) Hours As Needed for Severe Pain for up to 30 days.  Dispense: 60 tablet; Refill: 0  -     Patient Lift  -     Misc. Devices (Rolling Walker/Burgundy) misc; Use 1 each Take As Directed. With seat and brakes for ambulation  Dispense: 1 each; Refill: 0  -     Ambulatory Referral to Home Health    7. Other insomnia  -     ALPRAZolam (XANAX) 0.5 MG tablet; Take 1 tablet by mouth 2 (Two) Times a Day As Needed for Anxiety.  Dispense: 60 tablet; Refill: 0  -     Misc. Devices (Rolling Walker/Burgundy) misc; Use 1 each Take As Directed. With seat and brakes for ambulation  Dispense: 1 each; Refill: 0  -     Ambulatory Referral to Home Health    8. Primary osteoarthritis of both hips  -     Patient Lift  -     Misc. Devices (Rolling Walker/Burgundy) misc; Use 1 each Take As Directed. With seat and brakes for ambulation  Dispense: 1 each; Refill: 0  -     Ambulatory Referral to Home Health    9. Primary osteoarthritis of both knees  -     Patient Lift  -     Misc. Devices (Rolling Walker/Burgundy) misc; Use 1 each Take As Directed. With seat and brakes for ambulation  Dispense: 1 each; Refill: 0  -     Ambulatory Referral to Home Health    10. Primary hypertension  -     Misc. Devices (Rolling Walker/Burgundy) misc; Use 1 each Take As Directed. With seat and brakes for ambulation  Dispense: 1 each; Refill: 0  -     Ambulatory Referral to Home Health    11. Generalized weakness  -     Patient Lift  -     Misc. Devices (Rolling Walker/Burgundy) misc; Use 1 each Take As Directed. With seat and brakes for ambulation  Dispense: 1 each; Refill: 0  -     Ambulatory Referral to Home Health    12. Dyspnea on exertion  -     Patient Lift  -     Misc. Devices (Rolling Walker/Burgundy) misc; Use 1 each Take As Directed. With seat and brakes for ambulation  Dispense: 1 each; Refill: 0  -      Ambulatory Referral to Home Health    13. Dermatitis    14. Constipation, unspecified constipation type    Other orders  -     docusate sodium 100 MG capsule; Take 1 capsule by mouth 2 (Two) Times a Day As Needed (constipation).  Dispense: 60 each; Refill: 2  -     prochlorperazine (COMPAZINE) 10 MG tablet; Take 1 tablet by mouth Every 8 (Eight) Hours As Needed for Nausea or Vomiting.  Dispense: 60 tablet; Refill: 2  -     promethazine (PHENERGAN) 25 MG tablet; Take 0.5-1 tablets by mouth Every 8 (Eight) Hours As Needed for Nausea or Vomiting. Alternate with compazine  Dispense: 60 tablet; Refill: 2  -     senna (SENOKOT) 8.6 MG tablet; Take 1 tablet by mouth 2 (Two) Times a Day As Needed for Constipation.  Dispense: 60 tablet; Refill: 5  -     lisinopril (PRINIVIL,ZESTRIL) 20 MG tablet; Take 1 tablet by mouth Daily.  Dispense: 90 tablet; Refill: 1  -     meloxicam (MOBIC) 15 MG tablet; Take 1 tablet by mouth Daily As Needed for Moderate Pain.  Dispense: 90 tablet; Refill: 1  -     hydroCHLOROthiazide 25 MG tablet; Take 1 tablet by mouth Daily.  Dispense: 90 tablet; Refill: 1  -     triamcinolone (KENALOG) 0.1 % ointment; Apply 1 Application topically to the appropriate area as directed 2 (Two) Times a Day As Needed for Irritation or Rash.  Dispense: 30 g; Refill: 0      Follow-up with oncology as directed  Medication refills as above  Ordering walker with a seat, lift chair to [facility]  Outpatient therapy for L ankle on hold for now-will ask HH PT to eval/tx  HH referral for PT, SN, OT to eval and treat   Triamcinolone as needed to rash  BP elevated, resume lisinopril and HCTZ  Resume meloxicam as needed for left ankle, hip and knee pain  Refill norco for severe breakthrough cancer related pain  On 3lnc-goulds, cont and titrate to keep SpO2 greater than 88%  Was on ozempic, d/'c'd at hospital  Ordered glucometer to check BG daily  Okay for Xanax as needed for anxiety/insomnia          I spent 60 minutes caring for  Heidy Helms on this date of service. This time includes time spent by me in the following activities: preparing for the visit, reviewing tests, performing a medically appropriate examination and/or evaluation , counseling and educating the patient/family/caregiver, ordering medications, tests, or procedures and documenting information in the medical record        Follow Up     Return in about 4 weeks (around 3/3/2025) for Recheck.  Patient was given instructions and counseling regarding her condition or for health maintenance advice. Please see specific information pulled into the AVS if appropriate.        Part of this note may be an electronic transcription/translation of spoken language to printed text using the Dragon Dictation System

## 2025-02-04 RX ORDER — BLOOD SUGAR DIAGNOSTIC
STRIP MISCELLANEOUS
Qty: 100 EACH | Refills: 3 | Status: SHIPPED | OUTPATIENT
Start: 2025-02-04

## 2025-02-04 RX ORDER — BLOOD-GLUCOSE METER
1 EACH MISCELLANEOUS TAKE AS DIRECTED
Qty: 1 KIT | Refills: 0 | Status: SHIPPED | OUTPATIENT
Start: 2025-02-04

## 2025-02-04 RX ORDER — TRIAMCINOLONE ACETONIDE 1 MG/G
1 OINTMENT TOPICAL 2 TIMES DAILY PRN
Qty: 30 G | Refills: 0 | Status: SHIPPED | OUTPATIENT
Start: 2025-02-04

## 2025-02-04 RX ORDER — LANCING DEVICE/LANCETS
1 KIT MISCELLANEOUS DAILY
Qty: 1 KIT | Refills: 11 | Status: SHIPPED | OUTPATIENT
Start: 2025-02-04

## 2025-02-07 RX ORDER — HYDROCHLOROTHIAZIDE 25 MG/1
25 TABLET ORAL DAILY
Qty: 90 TABLET | Refills: 1 | OUTPATIENT
Start: 2025-02-07

## 2025-02-07 NOTE — TELEPHONE ENCOUNTER
Caller: Heidy Helms    Relationship: Self    Best call back number: [phone]    Requested Prescriptions:   Requested Prescriptions     Pending Prescriptions Disp Refills    hydroCHLOROthiazide 25 MG tablet 90 tablet 1     Sig: Take 1 tablet by mouth Daily.        docusate sodium 100 MG capsule       Pharmacy where request should be sent: [facility][id number] - [facility] [place] [facility] [phone] [facility] [phone] FX     Last office visit with prescribing clinician: 2/3/2025   Last telemedicine visit with prescribing clinician: Visit date not found   Next office visit with prescribing clinician: 2/27/2025     Additional details provided by patient:     PATIENT IS CALLING IN BECAUSE SHE WAS TOLD BY ANOTHER DOCTOR TO STOP TAKING THE     hydroCHLOROthiazide 25 MG tablet   SO SHE THREW IT AWAY. WELL NOW SHE WS PUT BACK ON IT AND HER PHARMACY STATES IT TOO EARLY TO REFILL BT SHE IS NEEDING TO START TAKING IT AGAIN. REQUESTING A NEW REFILL SCRIPT.    Does the patient have less than a 3 day supply:  [x] Yes  [] No    Would you like a call back once the refill request has been completed: [] Yes [x] No    If the office needs to give you a call back, can they leave a voicemail: [] Yes [x] No    Vita Evans Rep   02/07/25 10:56 EST

## 2025-02-10 ENCOUNTER — TELEPHONE (OUTPATIENT)
Dept: FAMILY MEDICINE CLINIC | Facility: CLINIC | Age: 65
End: 2025-02-10

## 2025-02-10 NOTE — TELEPHONE ENCOUNTER
Spoke with pharmacist at [facility] in [place], they will over ride for patient

## 2025-02-10 NOTE — TELEPHONE ENCOUNTER
Spoke with Leesa at [place]- order not showing up on their end. Confirmed fax number and resent.

## 2025-02-10 NOTE — TELEPHONE ENCOUNTER
Caller: Heidy Helms    Relationship: Self    Best call back number: [phone]    What orders are you requesting (i.e. lab or imaging): WALKER AND A LIFT LARRY    In what timeframe would the patient need to come in: N/A    Where will you receive your lab/imaging services: [facility]     Additional notes: PATIENT STATES HER AND Lidia Rose APRN DISCUSSED A WALKER AND THE ORDER NEVER GOT SENT IN. AND SHE NEVER HEARD ANYTHING BACK ABOUT A LIFT CHAIR.

## 2025-02-10 NOTE — TELEPHONE ENCOUNTER
Caller: Heidy Helms    Relationship to patient: Self    Best call back number: [phone]    Patient is needing: PATIENT CALLING IN BECAUSE HOSPITAL ADVISED HER TO STOP TAKING THE HYDROCHOLOROTHIAZIDE 25 MG, SO SHE THREW AWAY HER PRESCRIPTION. BUT THEN Lidia Rose APRN ADVISED HER TO CONTINUE TO TAKING IT AND SENT IN A PRESCRIPTION ON 2/3. PHARMACY IS STATING THE INSURANCE IS TELLING THEM IT ISN'T DUE FOR A MONTH.

## 2025-02-10 NOTE — TELEPHONE ENCOUNTER
LMOM with mick requesting call back to discuss if orders were received, status of items for patient.

## 2025-02-26 ENCOUNTER — TELEPHONE (OUTPATIENT)
Dept: FAMILY MEDICINE CLINIC | Facility: CLINIC | Age: 65
End: 2025-02-26
Payer: MEDICAID

## 2025-02-26 RX ORDER — ACETAMINOPHEN 160 MG
2000 TABLET,DISINTEGRATING ORAL DAILY
Qty: 30 CAPSULE | Refills: 1 | Status: SHIPPED | OUTPATIENT
Start: 2025-02-26

## 2025-02-26 RX ORDER — DEXAMETHASONE 4 MG/1
4 TABLET ORAL 3 TIMES DAILY
Qty: 90 TABLET | Refills: 1 | Status: SHIPPED | OUTPATIENT
Start: 2025-02-26

## 2025-02-26 NOTE — TELEPHONE ENCOUNTER
Caller: Heidy Helms    Relationship: Self    Best call back number:     [phone] (Home)       What medication are you requestin NEW MEDS      dexAMETHasone (DECADRON) 4 MG tablet  4 mg, 3 Times Daily           Summary: Take 1 tablet by mouth 3 (Three) Times a Day.        Cholecalciferol (Vitamin D3) 50 MCG (2000 UT) capsule  1 capsule, Daily           Summary: Take 1 capsule by mouth Daily.            Have you had these symptoms before:    [x] Yes  [] No    Have you been treated for these symptoms before:   [x] Yes  [] No    If a prescription is needed, what is your preferred pharmacy and phone number: [facility]/PHARMACY #[id number] - [id number] - [address] [facility] [phone] [facility] [phone] FX     Additional notes:

## 2025-02-27 ENCOUNTER — OFFICE VISIT (OUTPATIENT)
Dept: FAMILY MEDICINE CLINIC | Facility: CLINIC | Age: 65
End: 2025-02-27
Payer: MEDICAID

## 2025-02-27 VITALS
BODY MASS INDEX: 40.59 KG/M2 | HEIGHT: 65 IN | DIASTOLIC BLOOD PRESSURE: 73 MMHG | OXYGEN SATURATION: 98 % | WEIGHT: 243.6 LBS | HEART RATE: 86 BPM | TEMPERATURE: 98.3 F | SYSTOLIC BLOOD PRESSURE: 112 MMHG

## 2025-02-27 DIAGNOSIS — C79.9 ADENOCARCINOMA, METASTATIC: Primary | ICD-10-CM

## 2025-02-27 DIAGNOSIS — M15.0 PRIMARY OSTEOARTHRITIS INVOLVING MULTIPLE JOINTS: ICD-10-CM

## 2025-02-27 DIAGNOSIS — L30.9 DERMATITIS: ICD-10-CM

## 2025-02-27 DIAGNOSIS — G47.09 OTHER INSOMNIA: ICD-10-CM

## 2025-02-27 DIAGNOSIS — M85.89 OSTEOPENIA OF MULTIPLE SITES: ICD-10-CM

## 2025-02-27 DIAGNOSIS — Z85.3 HISTORY OF BREAST CANCER: ICD-10-CM

## 2025-02-27 DIAGNOSIS — M16.0 BILATERAL PRIMARY OSTEOARTHRITIS OF HIP: ICD-10-CM

## 2025-02-27 DIAGNOSIS — F41.9 ANXIETY: ICD-10-CM

## 2025-02-27 DIAGNOSIS — E04.1 THYROID NODULE: ICD-10-CM

## 2025-02-27 DIAGNOSIS — M62.81 GENERALIZED MUSCLE WEAKNESS: ICD-10-CM

## 2025-02-27 DIAGNOSIS — E11.9 TYPE 2 DIABETES MELLITUS WITHOUT COMPLICATION, WITHOUT LONG-TERM CURRENT USE OF INSULIN: ICD-10-CM

## 2025-02-27 DIAGNOSIS — M17.0 BILATERAL PRIMARY OSTEOARTHRITIS OF KNEE: ICD-10-CM

## 2025-02-27 RX ORDER — METFORMIN HYDROCHLORIDE 500 MG/1
500 TABLET, EXTENDED RELEASE ORAL
Qty: 30 TABLET | Refills: 2 | Status: SHIPPED | OUTPATIENT
Start: 2025-02-27

## 2025-02-27 RX ORDER — TRIAMCINOLONE ACETONIDE 1 MG/G
1 OINTMENT TOPICAL 2 TIMES DAILY PRN
Qty: 30 G | Refills: 0 | Status: SHIPPED | OUTPATIENT
Start: 2025-02-27

## 2025-02-27 NOTE — PROGRESS NOTES
Chief Complaint  Chief Complaint   Patient presents with    Follow-up     3 week follow up  Pt having increased weakness and leg pain. Requesting lift chair, shower bench, bedside comode  RF triamcinolone cream for rash  States that steroids are making sugars spike and tachycardic heart rate  Completed radiation last week, still doing chemo  Review PET scan results from 2/21/25             Subjective          Heidy Helms presents to [facility] for   History of Present Illness    Adenocarcinoma  with widespread metastasis, completed radiation last week to brain. Mri brain scheduled in 1 week. She is doing chemo currently, c/o severe generalized weakness, severe difficulty with sit to stand, pain in B hips/knees. Once standing she can walk using assist device.  She is going to the gym 5 days. She is taking oral and IV chemo.     IMPRESSION:   1.Metastatic mediastinal, hilar, mesenteric, and right external iliac   lymphadenopathy.   2.Widespread pulmonary, hepatic, osseous, and subcutaneous metastases.   3.Moderate right pleural effusion with FDG avid pleural nodularity   highly suspicious for a malignant effusion.   4.FDG avid mass at the proximal transverse colon compatible with the   biopsy-proven metastasis.   5.Indeterminate FDG avid right thyroid nodule. Follow-up thyroid   ultrasound is recommended if not previously evaluated.     DMII, 5.8 in October at [facility], not currently on diabetic medications, she reports elevated blood sugar at home.     Anxiety, insomnia, much improved with xanax, patient denies significant weight loss/gain, insomnia, hypersomnia, psychomotor agitation, psychomotor retardation, fatigue (loss of energy), feelings of worthlessness (guilt), impaired concentration (indecisiveness), thoughts of death or suicide.           The following portions of the patient's history were reviewed and updated as appropriate: allergies, current  medications, past family history, past medical history, past social history, past surgical history and problem list.    Past Medical History:   Diagnosis Date    Arthritis     Cancer     Diabetes mellitus     Hypertension     Osteoporosis      Past Surgical History:   Procedure Laterality Date    BREAST SURGERY      HYSTERECTOMY      TONSILLECTOMY       Family History   Problem Relation Age of Onset    Cancer Mother     Cancer Sister     Cancer Maternal Aunt     Cancer Maternal Grandmother      Social History     Tobacco Use    Smoking status: Never     Passive exposure: Never    Smokeless tobacco: Never   Substance Use Topics    Alcohol use: Never       Current Outpatient Medications:     ALPRAZolam (XANAX) 0.5 MG tablet, Take 1 tablet by mouth 2 (Two) Times a Day As Needed for Anxiety., Disp: 60 tablet, Rfl: 0    Blood Glucose Monitoring Suppl (Accu-Chek Guide) w/Device kit, Use 1 each Take As Directed. To check blood glucose daily, Disp: 1 kit, Rfl: 0    Cholecalciferol (Vitamin D3) 50 MCG (2000 UT) capsule, Take 1 capsule by mouth Daily., Disp: 30 capsule, Rfl: 1    dexAMETHasone (DECADRON) 4 MG tablet, Take 1 tablet by mouth 3 (Three) Times a Day., Disp: 90 tablet, Rfl: 1    docusate sodium 100 MG capsule, Take 1 capsule by mouth 2 (Two) Times a Day As Needed (constipation)., Disp: 60 each, Rfl: 2    glucose blood (Accu-Chek Guide Test) test strip, Used to check blood sugar once daily, Disp: 100 each, Rfl: 3    hydroCHLOROthiazide 25 MG tablet, Take 1 tablet by mouth Daily., Disp: 90 tablet, Rfl: 1    HYDROcodone-acetaminophen (NORCO) 7.5-325 MG per tablet, Take 1 tablet by mouth Every 8 (Eight) Hours As Needed for Severe Pain for up to 30 days., Disp: 60 tablet, Rfl: 0    Lancets Misc. (Accu-Chek Softclix Lancet Dev) kit, Use 1 each Daily. To check blood glucose daily, Disp: 1 kit, Rfl: 11    lisinopril (PRINIVIL,ZESTRIL) 20 MG tablet, Take 1 tablet by mouth Daily., Disp: 90 tablet, Rfl: 1    meloxicam (MOBIC)  "15 MG tablet, Take 1 tablet by mouth Daily As Needed for Moderate Pain., Disp: 90 tablet, Rfl: 1    Misc. Devices (Rolling Walker/Burgundy) misc, Use 1 each Take As Directed. With seat and brakes for ambulation, Disp: 1 each, Rfl: 0    omeprazole (priLOSEC) 20 MG capsule, Take 1 capsule by mouth Daily., Disp: , Rfl:     prochlorperazine (COMPAZINE) 10 MG tablet, Take 1 tablet by mouth Every 8 (Eight) Hours As Needed for Nausea or Vomiting., Disp: 60 tablet, Rfl: 2    promethazine (PHENERGAN) 25 MG tablet, Take 0.5-1 tablets by mouth Every 8 (Eight) Hours As Needed for Nausea or Vomiting. Alternate with compazine, Disp: 60 tablet, Rfl: 2    ribociclib succinate, 400 MG Dose, (KISQALI) 200 MG tablet therapy pack tablet, Take 2 tablets by mouth Daily., Disp: , Rfl:     senna (SENOKOT) 8.6 MG tablet, Take 1 tablet by mouth 2 (Two) Times a Day As Needed for Constipation., Disp: 60 tablet, Rfl: 5    triamcinolone (KENALOG) 0.1 % ointment, Apply 1 Application topically to the appropriate area as directed 2 (Two) Times a Day As Needed for Irritation or Rash., Disp: 30 g, Rfl: 0    metFORMIN ER (GLUCOPHAGE-XR) 500 MG 24 hr tablet, Take 1 tablet by mouth Daily With Breakfast., Disp: 30 tablet, Rfl: 2    Misc. Devices (3-in-1 Bedside Toilet) misc, Use 1 each Daily., Disp: 1 each, Rfl: 0    Misc. Devices (Bath/Shower Seat) misc, Use 1 each Daily. shower bench/transfer bench, Disp: 1 each, Rfl: 0    Objective   Vital Signs:   Vitals:    02/27/25 1434   BP: 112/73   BP Location: Left arm   Patient Position: Sitting   Cuff Size: Large Adult   Pulse: 86   Temp: 98.3 °F (36.8 °C)   TempSrc: Oral   SpO2: 98%   Weight: 110 kg (243 lb 9.6 oz)   Height: 165.1 cm (65\")   PainSc: 6    PainLoc: Leg       Body mass index is 40.54 kg/m².    Physical Exam  Constitutional:       General: She is not in acute distress.     Appearance: Normal appearance. She is well-developed. She is ill-appearing. She is not diaphoretic.   HENT:      Head: " Normocephalic.   Eyes:      Conjunctiva/sclera: Conjunctivae normal.      Pupils: Pupils are equal, round, and reactive to light.   Neck:      Thyroid: No thyromegaly.      Vascular: No JVD.   Cardiovascular:      Rate and Rhythm: Normal rate and regular rhythm.      Heart sounds: Normal heart sounds. No murmur heard.  Pulmonary:      Effort: Pulmonary effort is normal. No respiratory distress.      Breath sounds: Normal breath sounds. No wheezing or rhonchi.   Abdominal:      General: Bowel sounds are normal. There is no distension.      Palpations: Abdomen is soft.      Tenderness: There is no abdominal tenderness.   Musculoskeletal:         General: Tenderness (B knee/hip and multi joint pain, mod jovel rom) present. No swelling. Normal range of motion.      Cervical back: Normal range of motion and neck supple. No tenderness.   Lymphadenopathy:      Cervical: No cervical adenopathy.   Skin:     General: Skin is warm and dry.      Coloration: Skin is not jaundiced.      Findings: No erythema or rash.   Neurological:      General: No focal deficit present.      Mental Status: She is alert and oriented to person, place, and time. Mental status is at baseline.      Sensory: No sensory deficit.      Motor: No weakness.      Gait: Gait normal.   Psychiatric:         Mood and Affect: Mood normal.         Behavior: Behavior normal.         Thought Content: Thought content normal.         Judgment: Judgment normal.          Result Review :     No visits with results within 7 Day(s) from this visit.   Latest known visit with results is:   No results found for any previous visit.                              Assessment and Plan    Diagnoses and all orders for this visit:    1. Adenocarcinoma, metastatic (Primary)  Comments:  cont with onc  cont chemo  reviewed pet and recent labs   repeat mri brain in 3mo  Orders:  -     Misc. Devices (3-in-1 Bedside Toilet) misc; Use 1 each Daily.  Dispense: 1 each; Refill: 0  -     Misc.  Devices (Bath/Shower Seat) misc; Use 1 each Daily. shower bench/transfer bench  Dispense: 1 each; Refill: 0  -     Miscellaneous DME    2. Thyroid nodule  Comments:  check u/s  Orders:  -     US Thyroid; Future    3. Anxiety    4. History of breast cancer  -     Misc. Devices (3-in-1 Bedside Toilet) misc; Use 1 each Daily.  Dispense: 1 each; Refill: 0  -     Misc. Devices (Bath/Shower Seat) misc; Use 1 each Daily. shower bench/transfer bench  Dispense: 1 each; Refill: 0  -     Miscellaneous DME    5. Primary osteoarthritis involving multiple joints  -     Misc. Devices (3-in-1 Bedside Toilet) misc; Use 1 each Daily.  Dispense: 1 each; Refill: 0  -     Misc. Devices (Bath/Shower Seat) misc; Use 1 each Daily. shower bench/transfer bench  Dispense: 1 each; Refill: 0  -     Miscellaneous DME    6. Other insomnia  Comments:  continue xanax    7. Type 2 diabetes mellitus without complication, without long-term current use of insulin  Comments:  start metformin  d/c ozempic.    8. Osteopenia of multiple sites  Comments:  resume fosamax  Orders:  -     Misc. Devices (3-in-1 Bedside Toilet) misc; Use 1 each Daily.  Dispense: 1 each; Refill: 0  -     Misc. Devices (Bath/Shower Seat) misc; Use 1 each Daily. shower bench/transfer bench  Dispense: 1 each; Refill: 0  -     Miscellaneous DME    9. Dermatitis    10. Generalized muscle weakness  -     Misc. Devices (3-in-1 Bedside Toilet) misc; Use 1 each Daily.  Dispense: 1 each; Refill: 0  -     Misc. Devices (Bath/Shower Seat) misc; Use 1 each Daily. shower bench/transfer bench  Dispense: 1 each; Refill: 0  -     Miscellaneous DME    11. Bilateral primary osteoarthritis of hip  -     Misc. Devices (3-in-1 Bedside Toilet) misc; Use 1 each Daily.  Dispense: 1 each; Refill: 0  -     Misc. Devices (Bath/Shower Seat) misc; Use 1 each Daily. shower bench/transfer bench  Dispense: 1 each; Refill: 0  -     Miscellaneous DME    12. Bilateral primary osteoarthritis of knee  -     Misc.  Devices (3-in-1 Bedside Toilet) misc; Use 1 each Daily.  Dispense: 1 each; Refill: 0  -     Misc. Devices (Bath/Shower Seat) misc; Use 1 each Daily. shower bench/transfer bench  Dispense: 1 each; Refill: 0  -     Miscellaneous DME    Other orders  -     triamcinolone (KENALOG) 0.1 % ointment; Apply 1 Application topically to the appropriate area as directed 2 (Two) Times a Day As Needed for Irritation or Rash.  Dispense: 30 g; Refill: 0  -     metFORMIN ER (GLUCOPHAGE-XR) 500 MG 24 hr tablet; Take 1 tablet by mouth Daily With Breakfast.  Dispense: 30 tablet; Refill: 2            I spent 60 minutes caring for Heidy Helms on this date of service. This time includes time spent by me in the following activities: preparing for the visit, reviewing tests, performing a medically appropriate examination and/or evaluation , counseling and educating the patient/family/caregiver, ordering medications, tests, or procedures and documenting information in the medical record        Follow Up     No follow-ups on file.  Patient was given instructions and counseling regarding her condition or for health maintenance advice. Please see specific information pulled into the AVS if appropriate.        Part of this note may be an electronic transcription/translation of spoken language to printed text using the Dragon Dictation System

## 2025-02-28 ENCOUNTER — TELEPHONE (OUTPATIENT)
Dept: FAMILY MEDICINE CLINIC | Facility: CLINIC | Age: 65
End: 2025-02-28

## 2025-02-28 RX ORDER — SEMAGLUTIDE 0.68 MG/ML
0.5 INJECTION, SOLUTION SUBCUTANEOUS WEEKLY
Refills: 2 | OUTPATIENT
Start: 2025-02-28

## 2025-02-28 NOTE — TELEPHONE ENCOUNTER
Caller: Heidy Helms    Relationship to patient: Self    Best call back number: [phone]    Patient is needing: PATIENT STATED SHE CALLED AROUND TO FIND SOMEONE WHO SHE COULD GET THE LIFT CHAIR FROM AND  [facility], AND JOSE F ALL TOLD HER THAT HER PCP WILL HAVE TO ASK HER INSURANCE WHERE TO GET IT.    PLEASE CALL TO [facility]

## 2025-02-28 NOTE — TELEPHONE ENCOUNTER
Caller: Heidy Helms    Relationship: Self    Best call back number: [phone]       Who are you requesting to speak with (clinical staff, provider,  specific staff member): CLINICAL STAFF      What was the call regarding: PATIENT REPORTS THAT [facility] DOES NOT SUPPLY LIFT CHAIRS.  PATIENT NEEDS TO KNOW WHAT SUPPLIER WILL HAVE THEM.  PLEASE CALL TO [facility]

## 2025-02-28 NOTE — TELEPHONE ENCOUNTER
Spoke with Heidy. Informed of order being sent to [employer]. Instructed to call back to office if any issues.

## 2025-03-05 ENCOUNTER — TELEPHONE (OUTPATIENT)
Dept: FAMILY MEDICINE CLINIC | Facility: CLINIC | Age: 65
End: 2025-03-05

## 2025-03-05 DIAGNOSIS — F41.9 ANXIETY: ICD-10-CM

## 2025-03-05 DIAGNOSIS — G47.09 OTHER INSOMNIA: ICD-10-CM

## 2025-03-05 NOTE — TELEPHONE ENCOUNTER
Caller: Scooter Heidy S    Relationship: Self    Best call back number: [phone]     Requested Prescriptions:   Requested Prescriptions     Pending Prescriptions Disp Refills    ALPRAZolam (XANAX) 0.5 MG tablet 60 tablet 0     Sig: Take 1 tablet by mouth 2 (Two) Times a Day As Needed for Anxiety.        Pharmacy where request should be sent: [id number] [place] [address] [phone] [facility] [phone]      Last office visit with prescribing clinician: 2/27/2025   Last telemedicine visit with prescribing clinician: Visit date not found   Next office visit with prescribing clinician: 5/5/2025       Would you like a call back once the refill request has been completed: [] Yes [x] No    If the office needs to give you a call back, can they leave a voicemail: [] Yes [x] No    [facility]ita Ibarra Rep   03/05/25 11:20 EST

## 2025-03-05 NOTE — TELEPHONE ENCOUNTER
Caller: Heidy Helms    Relationship: Self    Best call back number: [phone]    What was the call regarding: PATIENT STATES THAT SHE STILL HAD NOT HEARD ANYTHING FURTHER REGARDING THE LIFT CHAIR

## 2025-03-05 NOTE — TELEPHONE ENCOUNTER
Caller: Heidy Helms    Relationship: Self    Best call back number: [phone]     What orders are you requesting (i.e. lab or imaging): ULTRASOUND    Where will you receive your lab/imaging services: [facility], [place]    Additional notes: PATIENT STATES THAT HOSPITAL STILL HAD NOT RECEIVED ORDERS, AND WAS GIVEN A FAX NUMBER TO SEND ORDER TO.      [facility] FAX: [phone]

## 2025-03-05 NOTE — TELEPHONE ENCOUNTER
Caller: Heidy Helms    Relationship: Self    Best call back number: [phone]     What orders are you requesting (i.e. lab or imaging): SHOWER BENCH, AND BEDSIDE POTTY    Where will you receive your lab/imaging services: [facility]    Additional notes: PATIENT STATES THAT SHE HAD REQUESTED ORDER FOR EQUIPMENT, BUT [facility] HAD NOT RECEIVED IT.

## 2025-03-07 RX ORDER — ALPRAZOLAM 0.5 MG
0.5 TABLET ORAL 2 TIMES DAILY PRN
Qty: 60 TABLET | Refills: 2 | Status: SHIPPED | OUTPATIENT
Start: 2025-03-07

## 2025-03-12 ENCOUNTER — TELEPHONE (OUTPATIENT)
Dept: FAMILY MEDICINE CLINIC | Facility: CLINIC | Age: 65
End: 2025-03-12
Payer: MEDICAID

## 2025-03-12 DIAGNOSIS — C79.9 ADENOCARCINOMA, METASTATIC: Primary | ICD-10-CM

## 2025-03-12 DIAGNOSIS — G47.09 OTHER INSOMNIA: ICD-10-CM

## 2025-03-12 DIAGNOSIS — F41.9 ANXIETY: ICD-10-CM

## 2025-03-12 RX ORDER — LORAZEPAM 0.5 MG/1
0.5 TABLET ORAL 2 TIMES DAILY PRN
Qty: 60 TABLET | Refills: 2 | Status: SHIPPED | OUTPATIENT
Start: 2025-03-12

## 2025-03-12 NOTE — TELEPHONE ENCOUNTER
Spoke with emergency contact- gave information- is at [facility]- lung infection post chemotherapy

## 2025-03-14 ENCOUNTER — READMISSION MANAGEMENT (OUTPATIENT)
Dept: CALL CENTER | Facility: HOSPITAL | Age: 65
End: 2025-03-14
Payer: MEDICAID

## 2025-03-14 NOTE — OUTREACH NOTE
Prep Survey      Flowsheet Row Responses   [facility] facility patient discharged from? Non-BH   Is LACE score < 7 ? Non-BH Discharge   Eligibility Not Eligible   What are the reasons patient is not eligible? Other  [Transferred to Another [facility]]   Does the patient have one of the following disease processes/diagnoses(primary or secondary)? Other   Prep survey completed? Yes            Isela Keane Registered Nurse

## 2025-03-18 ENCOUNTER — TELEPHONE (OUTPATIENT)
Dept: FAMILY MEDICINE CLINIC | Facility: CLINIC | Age: 65
End: 2025-03-18
Payer: MEDICAID

## 2025-03-18 NOTE — TELEPHONE ENCOUNTER
LVM regarding US Thyroid order. Inquiring if has been scheduled, has been completed, or if this is still needing to complete    Relay

## 2025-03-26 ENCOUNTER — READMISSION MANAGEMENT (OUTPATIENT)
Dept: CALL CENTER | Facility: HOSPITAL | Age: 65
End: 2025-03-26
Payer: MEDICAID

## 2025-03-26 NOTE — OUTREACH NOTE
Prep Survey      Flowsheet Row Responses   [facility] [facility] facility patient discharged from? Non-BH   Is LACE score < 7 ? Non-BH Discharge   Eligibility Not Eligible   What are the reasons patient is not eligible? [facility]   Does the patient have one of the following disease processes/diagnoses(primary or secondary)? Other   Prep survey completed? Yes            Margo GONZALEZ - Registered Nurse

## 2025-03-31 ENCOUNTER — TELEPHONE (OUTPATIENT)
Dept: FAMILY MEDICINE CLINIC | Facility: CLINIC | Age: 65
End: 2025-03-31
Payer: MEDICAID

## 2025-03-31 DIAGNOSIS — Z85.3 HISTORY OF BREAST CANCER: ICD-10-CM

## 2025-03-31 DIAGNOSIS — C79.9 ADENOCARCINOMA, METASTATIC: Primary | ICD-10-CM

## 2025-03-31 NOTE — TELEPHONE ENCOUNTER
Caller: Heidy Helms    Relationship: Self    Best call back number: [phone]          What was the call regarding:   BENCH THAT SITS BOTH INSIDE/OUTSIDE THE TUB- A TUB TRANSFER BENCH  THIS ORDER GOES TO [facility]    REFERRAL FOR A CANCER SPECIALIST AT [facility]  SHE IS IN A NURSING HOME.  THIS IS A CHANGE TO AN EXISTING ORDER.

## 2025-04-04 NOTE — TELEPHONE ENCOUNTER
Spoke with Yohana at [facility]- have orders, but they did not go anywhere, she will reach out to intake team and will take care of from there.

## 2025-04-07 ENCOUNTER — TELEPHONE (OUTPATIENT)
Dept: ONCOLOGY | Facility: CLINIC | Age: 65
End: 2025-04-07
Payer: MEDICAID

## 2025-04-15 NOTE — PROGRESS NOTES
HEMATOLOGY ONCOLOGY OUTPATIENT CONSULTATION       Patient name: Heidy Helms  : 1960  MRN: [id number]  Primary Care Physician: Lidia Rose APRN  Referring Physician: Lidia Rose APRN  Reason For Consult: Metastatic breast cancer.     History of Present Illness:    2025: In the office for the first time seeking a second opinion. She was first diagnosed with breast cancer in 2020. She reports that because of her family history she underwent bilateral mastectomies. She had a 22 mm tumor in the left breast. It was classified as grade 3. It was strongly positive for estrogen and progesterone receptors in 90% of the cells. Her2 was negative by immunohistochemistry and her Oncotype DX was 23. She was started on anastrozole that she took only for a brief period of time. In the setting of myalgia and arthralgia she discontinued the drug. Later she was prescribed tamoxifen but did not take it because of unclear reasons. In addition she declined treatment with zoledronate. She did well until 2025 when she was found to have extensive metastatic disease with involvement of the brain, multiple bones, mediastinal lymph nodes, liver and transverse colon. It is reported that she had a colonoscopy and a liver biopsy. The report of pathology was of metastatic breast cancer that was estrogen receptor positive and progesterone receptor positive, as well as Her2 low positive at 1+. She was initially treated with fulvestrant and ribociclib. She received stereotactic radiosurgery. In 2025 she presented with a rash that was felt to be an effect of the ribociclib and plans to transition to abemaciclib were made. However, she was admitted to the hospital with pneumonia. She apparently developed a pleural effusion and had to be transferred to [place]. She ended up spending three weeks in the hospital and then was discharged to receive inpatient rehabilitation.  She is in the office without specific complaints today. She is feeling somewhat stronger. She has been eating better. No skin rash. Denies fevers. She has been having pain in the right upper quadrant. No nausea or vomiting. No chest pain or cough and no diarrhea or dysuria. On exam alert and conversant. Oriented and in no distress. No jaundice. Lungs clear and heart regular. Abdomen rounded and protuberant. Soft and not tender. No edema. Reviewed all the records from the [facility] system. Reviewed the available imaging studies and all the laboratory exams. She's to have repeat scans and laboratory exams. She's to see me in 2 weeks.     Past Medical History:   Diagnosis Date    Arthritis     Cancer     Diabetes mellitus     Hypertension     Osteoporosis      Past Surgical History:   Procedure Laterality Date    BREAST SURGERY      HYSTERECTOMY      TONSILLECTOMY         Current Outpatient Medications:     Blood Glucose Monitoring Suppl (Accu-Chek Guide) w/Device kit, Use 1 each Take As Directed. To check blood glucose daily, Disp: 1 kit, Rfl: 0    Cholecalciferol (Vitamin D3) 50 MCG (2000 UT) capsule, Take 1 capsule by mouth Daily., Disp: 30 capsule, Rfl: 1    dexAMETHasone (DECADRON) 4 MG tablet, Take 1 tablet by mouth 3 (Three) Times a Day., Disp: 90 tablet, Rfl: 1    docusate sodium 100 MG capsule, Take 1 capsule by mouth 2 (Two) Times a Day As Needed (constipation)., Disp: 60 each, Rfl: 2    glucose blood (Accu-Chek Guide Test) test strip, Used to check blood sugar once daily, Disp: 100 each, Rfl: 3    hydroCHLOROthiazide 25 MG tablet, Take 1 tablet by mouth Daily., Disp: 90 tablet, Rfl: 1    Lancets Misc. (Accu-Chek Softclix Lancet Dev) kit, Use 1 each Daily. To check blood glucose daily, Disp: 1 kit, Rfl: 11    lisinopril (PRINIVIL,ZESTRIL) 20 MG tablet, Take 1 tablet by mouth Daily., Disp: 90 tablet, Rfl: 1    LORazepam (Ativan) 0.5 MG tablet, Take 1 tablet by mouth 2 (Two) Times a Day As Needed for  Anxiety., Disp: 60 tablet, Rfl: 2    meloxicam (MOBIC) 15 MG tablet, Take 1 tablet by mouth Daily As Needed for Moderate Pain., Disp: 90 tablet, Rfl: 1    metFORMIN ER (GLUCOPHAGE-XR) 500 MG 24 hr tablet, Take 1 tablet by mouth Daily With Breakfast., Disp: 30 tablet, Rfl: 2    Misc. Devices (3-in-1 Bedside Toilet) misc, Use 1 each Daily., Disp: 1 each, Rfl: 0    Misc. Devices (Bath/Shower Seat) misc, Use 1 each Daily. shower bench/transfer bench, Disp: 1 each, Rfl: 0    Misc. Devices (Rolling Walker/Burgundy) misc, Use 1 each Take As Directed. With seat and brakes for ambulation, Disp: 1 each, Rfl: 0    omeprazole (priLOSEC) 20 MG capsule, Take 1 capsule by mouth Daily., Disp: , Rfl:     prochlorperazine (COMPAZINE) 10 MG tablet, Take 1 tablet by mouth Every 8 (Eight) Hours As Needed for Nausea or Vomiting., Disp: 60 tablet, Rfl: 2    promethazine (PHENERGAN) 25 MG tablet, Take 0.5-1 tablets by mouth Every 8 (Eight) Hours As Needed for Nausea or Vomiting. Alternate with compazine, Disp: 60 tablet, Rfl: 2    ribociclib succinate, 400 MG Dose, (KISQALI) 200 MG tablet therapy pack tablet, Take 2 tablets by mouth Daily., Disp: , Rfl:     senna (SENOKOT) 8.6 MG tablet, Take 1 tablet by mouth 2 (Two) Times a Day As Needed for Constipation., Disp: 60 tablet, Rfl: 5    triamcinolone (KENALOG) 0.1 % ointment, Apply 1 Application topically to the appropriate area as directed 2 (Two) Times a Day As Needed for Irritation or Rash., Disp: 30 g, Rfl: 0    No Known Allergies    Family History   Problem Relation Age of Onset    Cancer Mother     Cancer Sister     Cancer Maternal Aunt     Cancer Maternal Grandmother      Cancer-related family history includes Cancer in her maternal aunt, maternal grandmother, mother, and sister.    Social History     Tobacco Use    Smoking status: Never     Passive exposure: Never    Smokeless tobacco: Never   Vaping Use    Vaping status: Never Used   Substance Use Topics    Alcohol use: Never     Drug use: Never     Social History     Social History Narrative    Not on file     ROS:   Review of Systems   Constitutional:  Positive for activity change, fatigue and unexpected weight change. Negative for appetite change, chills, diaphoresis and fever.   HENT:  Negative for congestion, dental problem, drooling, ear discharge, ear pain, facial swelling, hearing loss, mouth sores, nosebleeds, postnasal drip, rhinorrhea, sinus pressure, sinus pain, sneezing, sore throat, tinnitus, trouble swallowing and voice change.    Eyes:  Negative for photophobia, pain, discharge, redness, itching and visual disturbance.   Respiratory:  Positive for shortness of breath. Negative for apnea, cough, choking, chest tightness, wheezing and stridor.    Cardiovascular:  Negative for chest pain, palpitations and leg swelling.   Gastrointestinal:  Positive for abdominal pain. Negative for abdominal distention, anal bleeding, blood in stool, constipation, diarrhea, nausea, rectal pain and vomiting.   Endocrine: Negative for cold intolerance, heat intolerance, polydipsia and polyuria.   Genitourinary:  Negative for decreased urine volume, difficulty urinating, dysuria, flank pain, frequency, genital sores, hematuria and urgency.   Musculoskeletal:  Negative for arthralgias, back pain, gait problem, joint swelling, myalgias, neck pain and neck stiffness.   Skin:  Negative for color change, pallor and rash.   Neurological:  Negative for dizziness, tremors, seizures, syncope, facial asymmetry, speech difficulty, weakness, light-headedness, numbness and headaches.   Hematological:  Negative for adenopathy. Does not bruise/bleed easily.   Psychiatric/Behavioral:  Negative for agitation, behavioral problems, confusion, decreased concentration, hallucinations, self-injury, sleep disturbance and suicidal ideas. The patient is not nervous/anxious.        Objective:    Vital Signs:  There were no vitals filed for this visit.  There is no height or  weight on file to calculate BMI.    ECOG  (1) Restricted in physically strenuous activity, ambulatory and able to do work of light nature    Physical Exam:   Physical Exam  Constitutional:       General: She is not in acute distress.     Appearance: She is ill-appearing. She is not toxic-appearing or diaphoretic.   HENT:      Head: Normocephalic and atraumatic.      Right Ear: External ear normal.      Left Ear: External ear normal.      Nose: Nose normal.      Mouth/Throat:      Mouth: Mucous membranes are moist.      Pharynx: Oropharynx is clear. No oropharyngeal exudate or posterior oropharyngeal erythema.   Eyes:      General: No scleral icterus.        Right eye: No discharge.         Left eye: No discharge.      Conjunctiva/sclera: Conjunctivae normal.      Pupils: Pupils are equal, round, and reactive to light.   Cardiovascular:      Rate and Rhythm: Normal rate and regular rhythm.      Pulses: Normal pulses.      Heart sounds: No murmur heard.     No friction rub. No gallop.   Pulmonary:      Effort: No respiratory distress.      Breath sounds: No stridor. No wheezing, rhonchi or rales.   Abdominal:      General: Bowel sounds are normal. There is no distension.      Palpations: Abdomen is soft. There is no mass.      Tenderness: There is no abdominal tenderness. There is no right CVA tenderness, left CVA tenderness, guarding or rebound.      Hernia: No hernia is present.      Comments: Protuberant and soft. Not tender to palpation; soft and without hepatomegaly or splenomegaly.    Musculoskeletal:         General: No tenderness, deformity or signs of injury.      Cervical back: No rigidity.      Right lower leg: No edema.      Left lower leg: No edema.   Lymphadenopathy:      Cervical: No cervical adenopathy.   Skin:     Coloration: Skin is not jaundiced or pale.      Findings: No bruising, lesion or rash.   Neurological:      General: No focal deficit present.      Mental Status: She is alert and oriented  to person, place, and time.      Cranial Nerves: No cranial nerve deficit.   Psychiatric:         Mood and Affect: Mood normal.         Behavior: Behavior normal.         Thought Content: Thought content normal.         Judgment: Judgment normal.     I Felipe Lenz MD performed the physical exam on 4/17/2025 as documented above.     Assessment & Plan     Apparent estrogen receptor and progesterone receptor positive, Her2 low positive at 1+: Currently and for the last 2 months off therapy. Needs scans for staging and to make decisions on treatment. I believe that fulvestrant or an aromatase inhibitor with a cyclin dependent kinase inhibitor is reasonable. However, visceral involvement often times is felt to be an indication for cytotoxic chemotherapy given the risk of progression and deterioration of organ function. Will obtain scans and will see in 10 to 14 days.   History of [id number] estrogen receptor positive and progesterone receptor positive, Her2 negative left breast cancer treated with bilateral mastectomies and incomplete adjuvant treatment with hormonal manipulation.   Diabetes mellitus  Class III obesity with BMI of 40 kg/m2  Reviewed al the records including the records from the [facility] and all imaging studies. Reviewed all the laboratory exams. Discussed with her the results.   She's to see me in 10 days with new scans and laboratory exams, including MRI of the delvis.     Felipe Lenz MD on 4/17/2025 at 17:01

## 2025-04-17 ENCOUNTER — CONSULT (OUTPATIENT)
Dept: ONCOLOGY | Facility: CLINIC | Age: 65
End: 2025-04-17
Payer: MEDICAID

## 2025-04-17 ENCOUNTER — LAB (OUTPATIENT)
Dept: LAB | Facility: HOSPITAL | Age: 65
End: 2025-04-17
Payer: MEDICAID

## 2025-04-17 VITALS
OXYGEN SATURATION: 97 % | TEMPERATURE: 97.1 F | DIASTOLIC BLOOD PRESSURE: 73 MMHG | BODY MASS INDEX: 40.25 KG/M2 | SYSTOLIC BLOOD PRESSURE: 107 MMHG | RESPIRATION RATE: 14 BRPM | WEIGHT: 241.6 LBS | HEART RATE: 101 BPM | HEIGHT: 65 IN

## 2025-04-17 DIAGNOSIS — Z85.3 HISTORY OF BREAST CANCER: Primary | ICD-10-CM

## 2025-04-17 DIAGNOSIS — Z85.3 HISTORY OF BREAST CANCER: ICD-10-CM

## 2025-04-17 DIAGNOSIS — C50.919 MALIGNANT NEOPLASM OF FEMALE BREAST, UNSPECIFIED ESTROGEN RECEPTOR STATUS, UNSPECIFIED LATERALITY, UNSPECIFIED SITE OF BREAST: Primary | ICD-10-CM

## 2025-04-17 LAB
ALBUMIN SERPL-MCNC: 3.8 G/DL (ref 3.5–5.2)
ALBUMIN/GLOB SERPL: 1.6 G/DL
ALP SERPL-CCNC: 81 U/L (ref 39–117)
ALT SERPL W P-5'-P-CCNC: 21 U/L (ref 1–33)
ANION GAP SERPL CALCULATED.3IONS-SCNC: 12.3 MMOL/L (ref 5–15)
AST SERPL-CCNC: 24 U/L (ref 1–32)
BASOPHILS # BLD AUTO: 0.03 10*3/MM3 (ref 0–0.2)
BASOPHILS NFR BLD AUTO: 0.2 % (ref 0–1.5)
BILIRUB SERPL-MCNC: 0.3 MG/DL (ref 0–1.2)
BUN SERPL-MCNC: 13 MG/DL (ref 8–23)
BUN/CREAT SERPL: 23.2 (ref 7–25)
CALCIUM SPEC-SCNC: 9.6 MG/DL (ref 8.6–10.5)
CHLORIDE SERPL-SCNC: 97 MMOL/L (ref 98–107)
CO2 SERPL-SCNC: 30.7 MMOL/L (ref 22–29)
CREAT SERPL-MCNC: 0.56 MG/DL (ref 0.57–1)
DEPRECATED RDW RBC AUTO: 55.9 FL (ref 37–54)
EGFRCR SERPLBLD CKD-EPI 2021: 102.7 ML/MIN/1.73
EOSINOPHIL # BLD AUTO: 0.32 10*3/MM3 (ref 0–0.4)
EOSINOPHIL NFR BLD AUTO: 2.1 % (ref 0.3–6.2)
ERYTHROCYTE [DISTWIDTH] IN BLOOD BY AUTOMATED COUNT: 18.1 % (ref 12.3–15.4)
GLOBULIN UR ELPH-MCNC: 2.4 GM/DL
GLUCOSE SERPL-MCNC: 85 MG/DL (ref 65–99)
HCT VFR BLD AUTO: 38.2 % (ref 34–46.6)
HGB BLD-MCNC: 11.8 G/DL (ref 12–15.9)
HOLD SPECIMEN: NORMAL
HOLD SPECIMEN: NORMAL
LYMPHOCYTES # BLD AUTO: 2.29 10*3/MM3 (ref 0.7–3.1)
LYMPHOCYTES NFR BLD AUTO: 14.7 % (ref 19.6–45.3)
MCH RBC QN AUTO: 26.6 PG (ref 26.6–33)
MCHC RBC AUTO-ENTMCNC: 30.9 G/DL (ref 31.5–35.7)
MCV RBC AUTO: 86.2 FL (ref 79–97)
MONOCYTES # BLD AUTO: 0.76 10*3/MM3 (ref 0.1–0.9)
MONOCYTES NFR BLD AUTO: 4.9 % (ref 5–12)
NEUTROPHILS NFR BLD AUTO: 12.13 10*3/MM3 (ref 1.7–7)
NEUTROPHILS NFR BLD AUTO: 78.1 % (ref 42.7–76)
PLATELET # BLD AUTO: 289 10*3/MM3 (ref 140–450)
PMV BLD AUTO: 9.7 FL (ref 6–12)
POTASSIUM SERPL-SCNC: 3.8 MMOL/L (ref 3.5–5.2)
PROT SERPL-MCNC: 6.2 G/DL (ref 6–8.5)
RBC # BLD AUTO: 4.43 10*6/MM3 (ref 3.77–5.28)
SODIUM SERPL-SCNC: 140 MMOL/L (ref 136–145)
WBC NRBC COR # BLD AUTO: 15.53 10*3/MM3 (ref 3.4–10.8)

## 2025-04-17 PROCEDURE — 85025 COMPLETE CBC W/AUTO DIFF WBC: CPT | Performed by: INTERNAL MEDICINE

## 2025-04-17 PROCEDURE — 80053 COMPREHEN METABOLIC PANEL: CPT | Performed by: INTERNAL MEDICINE

## 2025-04-17 PROCEDURE — 36415 COLL VENOUS BLD VENIPUNCTURE: CPT

## 2025-04-17 RX ORDER — ALPRAZOLAM 0.5 MG
0.5 TABLET ORAL
COMMUNITY
Start: 2025-03-25

## 2025-04-18 ENCOUNTER — TELEPHONE (OUTPATIENT)
Dept: ONCOLOGY | Facility: CLINIC | Age: 65
End: 2025-04-18
Payer: MEDICAID

## 2025-04-18 LAB — CANCER AG27-29 SERPL-ACNC: 62.2 U/ML (ref 0–38.6)

## 2025-04-18 NOTE — TELEPHONE ENCOUNTER
Caller: Heidy Helms    Relationship: Self    Best call back number: [phone]    What was the call regarding: PATIENT WAS IN ON 4/17/2025.  PER THE OFFICE NOTE, DR. BELL WANTED HER SCANS, LAB, & F/U TO BE DONE WITHIN 10 DAYS.    CALL TO ADVISE

## 2025-04-18 NOTE — TELEPHONE ENCOUNTER
Returned Pt call to let her know I had called the scheduling H[facility] to make her appts for the MRI and CT for the Very Next Availability Possible. Which is what she is elia'd for. Dr Lenz was elia'd to see her after to s/w her on those results. Pt v/u

## 2025-05-07 DIAGNOSIS — T40.2X5A OPIOID-INDUCED CONSTIPATION: Primary | ICD-10-CM

## 2025-05-07 DIAGNOSIS — K59.03 OPIOID-INDUCED CONSTIPATION: Primary | ICD-10-CM

## 2025-05-07 DIAGNOSIS — F41.9 ANXIETY: ICD-10-CM

## 2025-05-07 RX ORDER — HYDROCHLOROTHIAZIDE 25 MG/1
25 TABLET ORAL DAILY
Qty: 90 TABLET | Refills: 1 | Status: SHIPPED | OUTPATIENT
Start: 2025-05-07

## 2025-05-07 RX ORDER — ALPRAZOLAM 0.5 MG
0.5 TABLET ORAL
OUTPATIENT
Start: 2025-05-07

## 2025-05-07 NOTE — TELEPHONE ENCOUNTER
Caller: Heidy Helms    Relationship: Self    Best call back number: [phone]     Requested Prescriptions:   Requested Prescriptions     Pending Prescriptions Disp Refills    hydroCHLOROthiazide 25 MG tablet 90 tablet 1     Sig: Take 1 tablet by mouth Daily.    ALPRAZolam (XANAX) 0.5 MG tablet       Sig: Take 1 tablet by mouth.      LASICS?   Pharmacy where request should be sent: [facility]/PHARMACY #[id number] - [facility] [place] [address] [phone] [facility] [phone]      Last office visit with prescribing clinician: 2/27/2025   Last telemedicine visit with prescribing clinician: Visit date not found   Next office visit with prescribing clinician: 5/28/2025     Additional details provided by patient: OUT  LASIC'S IS NOT IN HER MEDICATION LIST    Does the patient have less than a 3 day supply:  [x] Yes  [] No    Would you like a call back once the refill request has been completed: [] Yes [] No    If the office needs to give you a call back, can they leave a voicemail: [] Yes [] No    Johnathan Dempsey   05/07/25 13:54 EDT

## 2025-05-07 NOTE — TELEPHONE ENCOUNTER
Caller: Heidy Helms    Relationship: Self    Best call back number:     [phone]       Which medication are you concerned about: docusate sodium 100 MG capsule     Who prescribed you this medication: YAMIL    When did you start taking this medication: NA    What are your concerns: NOT HELPING WITH THE CONSTIPATION

## 2025-05-08 RX ORDER — PROCHLORPERAZINE MALEATE 10 MG
10 TABLET ORAL EVERY 8 HOURS PRN
Qty: 60 TABLET | Refills: 2 | Status: SHIPPED | OUTPATIENT
Start: 2025-05-08

## 2025-05-08 RX ORDER — ALPRAZOLAM 0.5 MG
0.5 TABLET ORAL 2 TIMES DAILY PRN
Qty: 60 TABLET | Refills: 2 | Status: SHIPPED | OUTPATIENT
Start: 2025-05-08

## 2025-05-08 NOTE — TELEPHONE ENCOUNTER
Pt states she wants xanax refilled due to sleep issues. Also stated she is not on any chemo x3 months

## 2025-05-09 ENCOUNTER — TELEPHONE (OUTPATIENT)
Dept: FAMILY MEDICINE CLINIC | Facility: CLINIC | Age: 65
End: 2025-05-09

## 2025-05-09 NOTE — TELEPHONE ENCOUNTER
Caller: Heidy Helms    Relationship to patient: Self      Best call back number:   Telephone Information:   Mobile [phone]         Provider: YAMIL     Medication PA needed: linaclotide (Linzess) 72 MCG capsule capsule     Reason for call/Prior Auth: PHARMACY ADVISED NEEDS PRIOR AUTH. PLEASE CALL PATIENT TO ADVISE. SHE STATES SHE NEEDS SOMETHING BEFORE THE WEEKEND.

## 2025-05-16 RX ORDER — SEMAGLUTIDE 0.68 MG/ML
0.5 INJECTION, SOLUTION SUBCUTANEOUS WEEKLY
Refills: 2 | OUTPATIENT
Start: 2025-05-16

## 2025-05-16 RX ORDER — LISINOPRIL 20 MG/1
20 TABLET ORAL DAILY
Qty: 90 TABLET | Refills: 1 | Status: SHIPPED | OUTPATIENT
Start: 2025-05-16

## 2025-05-16 RX ORDER — TRIAMCINOLONE ACETONIDE 1 MG/G
OINTMENT TOPICAL
Qty: 30 G | Refills: 0 | Status: SHIPPED | OUTPATIENT
Start: 2025-05-16

## 2025-05-16 RX ORDER — METFORMIN HYDROCHLORIDE 500 MG/1
500 TABLET, EXTENDED RELEASE ORAL
Qty: 30 TABLET | Refills: 2 | Status: SHIPPED | OUTPATIENT
Start: 2025-05-16

## 2025-05-23 RX ORDER — PSEUDOEPHEDRINE HCL 30 MG
100 TABLET ORAL 2 TIMES DAILY PRN
Qty: 60 EACH | Refills: 2 | Status: SHIPPED | OUTPATIENT
Start: 2025-05-23

## 2025-05-23 NOTE — TELEPHONE ENCOUNTER
Requested Prescriptions:   Requested Prescriptions     Pending Prescriptions Disp Refills    docusate sodium 100 MG capsule 60 each 2     Sig: Take 1 capsule by mouth 2 (Two) Times a Day As Needed (constipation).    [facility]     Pharmacy where request should be sent: [facility]/PHARMACY #[id number] - [place], [place] - [facility] [phone] [facility] [phone]      Last office visit with prescribing clinician: 2/27/2025   Last telemedicine visit with prescribing clinician: Visit date not found   Next office visit with prescribing clinician: 5/28/2025     Additional details provided by patient: PLEASE ADVISE.     Does the patient have less than a 3 day supply:  [x] Yes  [] No    Would you like a call back once the refill request has been completed: [] Yes [x] No    If the office needs to give you a call back, can they leave a voicemail: [] Yes [x] No    Vita Wilde Rep   05/23/25 14:29 EDT

## 2025-06-26 DIAGNOSIS — F41.9 ANXIETY: ICD-10-CM

## 2025-06-26 NOTE — TELEPHONE ENCOUNTER
Caller: Helms Heidy S    Relationship: Self    Best call back number:   Telephone Information:   Mobile [phone]         Requested Prescriptions:   Requested Prescriptions     Pending Prescriptions Disp Refills    ALPRAZolam (XANAX) 0.5 MG tablet 60 tablet 2     Sig: Take 1 tablet by mouth 2 (Two) Times a Day As Needed for Anxiety or Sleep.       HYDROCODONE 10/3.25 MG    Pharmacy where request should be sent: [facility]/PHARMACY #[id number] - [facility] [place] [address] [phone] [facility] [phone]      Last office visit with prescribing clinician: 2/27/2025   Last telemedicine visit with prescribing clinician: Visit date not found   Next office visit with prescribing clinician: Visit date not found     Additional details provided by patient: PATIENT IS REQUESTING A REFILL BE SENT IN FOR THE XANAX AND THE HYDROCODONE. PATIENT STATED SHE HAD 5 REMAINING OF THE HYDROCODONE, AND 10 LEFT OF THE XANAX    Does the patient have less than a 3 day supply:  [] Yes  [x] No    Would you like a call back once the refill request has been completed: [] Yes [x] No    If the office needs to give you a call back, can they leave a voicemail: [] Yes [x] No    Vita Donato Rep   06/26/25 15:45 EDT

## 2025-06-27 RX ORDER — ALPRAZOLAM 0.5 MG
0.5 TABLET ORAL 2 TIMES DAILY PRN
Qty: 60 TABLET | Refills: 2 | Status: SHIPPED | OUTPATIENT
Start: 2025-06-27

## 2025-06-27 NOTE — TELEPHONE ENCOUNTER
PATIENT JUST NEEDS THE XANAX REFILLED. SHE DID A REFILL REQUEST FOR THE HYDROCODONE WITH THE PRESCRIBING DOCTOR.

## 2025-07-29 ENCOUNTER — TELEPHONE (OUTPATIENT)
Dept: FAMILY MEDICINE CLINIC | Facility: CLINIC | Age: 65
End: 2025-07-29

## 2025-07-30 DIAGNOSIS — F41.9 ANXIETY: ICD-10-CM

## 2025-07-30 RX ORDER — ALPRAZOLAM 1 MG/1
.5-1 TABLET ORAL 2 TIMES DAILY PRN
Qty: 60 TABLET | Refills: 2 | Status: SHIPPED | OUTPATIENT
Start: 2025-07-30

## 2025-08-06 RX ORDER — PSEUDOEPHEDRINE HCL 30 MG
100 TABLET ORAL 2 TIMES DAILY PRN
Qty: 60 EACH | Refills: 2 | Status: SHIPPED | OUTPATIENT
Start: 2025-08-06

## 2025-08-07 ENCOUNTER — TELEPHONE (OUTPATIENT)
Dept: FAMILY MEDICINE CLINIC | Facility: CLINIC | Age: 65
End: 2025-08-07
Payer: MEDICARE

## 2025-08-08 RX ORDER — SERTRALINE HYDROCHLORIDE 25 MG/1
25 TABLET, FILM COATED ORAL DAILY
Qty: 30 TABLET | Refills: 2 | Status: SHIPPED | OUTPATIENT
Start: 2025-08-08

## 2025-08-11 ENCOUNTER — LAB (OUTPATIENT)
Dept: FAMILY MEDICINE CLINIC | Facility: CLINIC | Age: 65
End: 2025-08-11
Payer: MEDICARE

## 2025-08-11 ENCOUNTER — OFFICE VISIT (OUTPATIENT)
Dept: FAMILY MEDICINE CLINIC | Facility: CLINIC | Age: 65
End: 2025-08-11
Payer: MEDICARE

## 2025-08-11 VITALS
SYSTOLIC BLOOD PRESSURE: 106 MMHG | HEIGHT: 65 IN | WEIGHT: 202 LBS | HEART RATE: 106 BPM | BODY MASS INDEX: 33.66 KG/M2 | DIASTOLIC BLOOD PRESSURE: 73 MMHG | OXYGEN SATURATION: 95 %

## 2025-08-11 DIAGNOSIS — T40.2X5A OPIOID-INDUCED CONSTIPATION: ICD-10-CM

## 2025-08-11 DIAGNOSIS — R11.0 NAUSEA: ICD-10-CM

## 2025-08-11 DIAGNOSIS — E11.9 TYPE 2 DIABETES MELLITUS WITHOUT COMPLICATION, WITHOUT LONG-TERM CURRENT USE OF INSULIN: ICD-10-CM

## 2025-08-11 DIAGNOSIS — F41.9 ANXIETY: Primary | ICD-10-CM

## 2025-08-11 DIAGNOSIS — C79.9 ADENOCARCINOMA, METASTATIC: ICD-10-CM

## 2025-08-11 DIAGNOSIS — M15.0 PRIMARY OSTEOARTHRITIS INVOLVING MULTIPLE JOINTS: ICD-10-CM

## 2025-08-11 DIAGNOSIS — K21.9 GASTROESOPHAGEAL REFLUX DISEASE WITHOUT ESOPHAGITIS: ICD-10-CM

## 2025-08-11 DIAGNOSIS — I10 PRIMARY HYPERTENSION: ICD-10-CM

## 2025-08-11 DIAGNOSIS — E87.6 HYPOKALEMIA: ICD-10-CM

## 2025-08-11 DIAGNOSIS — K59.03 OPIOID-INDUCED CONSTIPATION: ICD-10-CM

## 2025-08-11 DIAGNOSIS — G47.09 OTHER INSOMNIA: ICD-10-CM

## 2025-08-11 DIAGNOSIS — R53.1 GENERALIZED WEAKNESS: ICD-10-CM

## 2025-08-11 LAB
DEPRECATED RDW RBC AUTO: 63.8 FL (ref 37–54)
ERYTHROCYTE [DISTWIDTH] IN BLOOD BY AUTOMATED COUNT: 20.2 % (ref 12.3–15.4)
HCT VFR BLD AUTO: 37.6 % (ref 34–46.6)
HGB BLD-MCNC: 12.3 G/DL (ref 12–15.9)
MCH RBC QN AUTO: 29.3 PG (ref 26.6–33)
MCHC RBC AUTO-ENTMCNC: 32.7 G/DL (ref 31.5–35.7)
MCV RBC AUTO: 89.5 FL (ref 79–97)
PLATELET # BLD AUTO: 340 10*3/MM3 (ref 140–450)
PMV BLD AUTO: 10.6 FL (ref 6–12)
RBC # BLD AUTO: 4.2 10*6/MM3 (ref 3.77–5.28)
WBC NRBC COR # BLD AUTO: 6.54 10*3/MM3 (ref 3.4–10.8)

## 2025-08-11 PROCEDURE — 83036 HEMOGLOBIN GLYCOSYLATED A1C: CPT | Performed by: NURSE PRACTITIONER

## 2025-08-11 PROCEDURE — 1160F RVW MEDS BY RX/DR IN RCRD: CPT | Performed by: NURSE PRACTITIONER

## 2025-08-11 PROCEDURE — 99214 OFFICE O/P EST MOD 30 MIN: CPT | Performed by: NURSE PRACTITIONER

## 2025-08-11 PROCEDURE — 85027 COMPLETE CBC AUTOMATED: CPT | Performed by: NURSE PRACTITIONER

## 2025-08-11 PROCEDURE — 1159F MED LIST DOCD IN RCRD: CPT | Performed by: NURSE PRACTITIONER

## 2025-08-11 PROCEDURE — 36415 COLL VENOUS BLD VENIPUNCTURE: CPT | Performed by: NURSE PRACTITIONER

## 2025-08-11 PROCEDURE — 1126F AMNT PAIN NOTED NONE PRSNT: CPT | Performed by: NURSE PRACTITIONER

## 2025-08-11 PROCEDURE — 80053 COMPREHEN METABOLIC PANEL: CPT | Performed by: NURSE PRACTITIONER

## 2025-08-11 PROCEDURE — 80061 LIPID PANEL: CPT | Performed by: NURSE PRACTITIONER

## 2025-08-11 RX ORDER — HYDROCODONE BITARTRATE AND ACETAMINOPHEN 10; 325 MG/1; MG/1
1 TABLET ORAL
COMMUNITY
Start: 2025-08-07

## 2025-08-11 RX ORDER — LORAZEPAM 0.5 MG/1
0.5 TABLET ORAL
COMMUNITY
End: 2025-08-11

## 2025-08-11 RX ORDER — CLONAZEPAM 1 MG/1
1 TABLET ORAL 2 TIMES DAILY PRN
Qty: 60 TABLET | Refills: 0 | Status: SHIPPED | OUTPATIENT
Start: 2025-08-11

## 2025-08-11 RX ORDER — ONDANSETRON 8 MG/1
8 TABLET, FILM COATED ORAL
COMMUNITY
Start: 2025-08-07

## 2025-08-11 RX ORDER — POTASSIUM CHLORIDE 1500 MG/1
1 TABLET, EXTENDED RELEASE ORAL DAILY
COMMUNITY
Start: 2025-07-24

## 2025-08-11 RX ORDER — LANOLIN ALCOHOL/MO/W.PET/CERES
1 CREAM (GRAM) TOPICAL EVERY 12 HOURS SCHEDULED
COMMUNITY
Start: 2025-08-08

## 2025-08-11 RX ORDER — MIRTAZAPINE 15 MG/1
15 TABLET, ORALLY DISINTEGRATING ORAL
COMMUNITY

## 2025-08-12 LAB
ALBUMIN SERPL-MCNC: 3.6 G/DL (ref 3.5–5.2)
ALBUMIN/GLOB SERPL: 1.2 G/DL
ALP SERPL-CCNC: 79 U/L (ref 39–117)
ALT SERPL W P-5'-P-CCNC: 33 U/L (ref 1–33)
ANION GAP SERPL CALCULATED.3IONS-SCNC: 11 MMOL/L (ref 5–15)
AST SERPL-CCNC: 42 U/L (ref 1–32)
BILIRUB SERPL-MCNC: 0.3 MG/DL (ref 0–1.2)
BUN SERPL-MCNC: 17 MG/DL (ref 8–23)
BUN/CREAT SERPL: 22.1 (ref 7–25)
CALCIUM SPEC-SCNC: 9 MG/DL (ref 8.6–10.5)
CHLORIDE SERPL-SCNC: 101 MMOL/L (ref 98–107)
CHOLEST SERPL-MCNC: 213 MG/DL (ref 0–200)
CO2 SERPL-SCNC: 26 MMOL/L (ref 22–29)
CREAT SERPL-MCNC: 0.77 MG/DL (ref 0.57–1)
EGFRCR SERPLBLD CKD-EPI 2021: 86.8 ML/MIN/1.73
GLOBULIN UR ELPH-MCNC: 2.9 GM/DL
GLUCOSE SERPL-MCNC: 101 MG/DL (ref 65–99)
HBA1C MFR BLD: 5.9 % (ref 4.8–5.6)
HDLC SERPL-MCNC: 52 MG/DL (ref 40–60)
LDLC SERPL CALC-MCNC: 132 MG/DL (ref 0–100)
LDLC/HDLC SERPL: 2.46 {RATIO}
POTASSIUM SERPL-SCNC: 3.3 MMOL/L (ref 3.5–5.2)
PROT SERPL-MCNC: 6.5 G/DL (ref 6–8.5)
SODIUM SERPL-SCNC: 138 MMOL/L (ref 136–145)
TRIGL SERPL-MCNC: 165 MG/DL (ref 0–150)
VLDLC SERPL-MCNC: 29 MG/DL (ref 5–40)

## 2025-08-19 RX ORDER — METFORMIN HYDROCHLORIDE 500 MG/1
500 TABLET, EXTENDED RELEASE ORAL
Qty: 30 TABLET | Refills: 2 | OUTPATIENT
Start: 2025-08-19

## 2025-08-27 RX ORDER — LISINOPRIL 20 MG/1
20 TABLET ORAL DAILY
Qty: 90 TABLET | Refills: 1 | Status: SHIPPED | OUTPATIENT
Start: 2025-08-27